# Patient Record
Sex: FEMALE | Race: WHITE | NOT HISPANIC OR LATINO | Employment: FULL TIME | URBAN - METROPOLITAN AREA
[De-identification: names, ages, dates, MRNs, and addresses within clinical notes are randomized per-mention and may not be internally consistent; named-entity substitution may affect disease eponyms.]

---

## 2017-07-17 PROBLEM — R87.810 PAPANICOLAOU SMEAR OF CERVIX WITH POSITIVE HIGH RISK HUMAN PAPILLOMA VIRUS (HPV) TEST: Status: ACTIVE | Noted: 2017-07-17

## 2023-04-17 ENCOUNTER — OFFICE VISIT (OUTPATIENT)
Dept: PRIMARY CARE | Facility: CLINIC | Age: 58
End: 2023-04-17
Payer: COMMERCIAL

## 2023-04-17 VITALS
TEMPERATURE: 97.1 F | BODY MASS INDEX: 27.87 KG/M2 | WEIGHT: 173.4 LBS | HEIGHT: 66 IN | DIASTOLIC BLOOD PRESSURE: 84 MMHG | SYSTOLIC BLOOD PRESSURE: 122 MMHG

## 2023-04-17 DIAGNOSIS — M62.838 MUSCLE SPASM: Primary | ICD-10-CM

## 2023-04-17 PROCEDURE — 3008F BODY MASS INDEX DOCD: CPT | Performed by: INTERNAL MEDICINE

## 2023-04-17 PROCEDURE — 99213 OFFICE O/P EST LOW 20 MIN: CPT | Performed by: INTERNAL MEDICINE

## 2023-04-17 RX ORDER — CYCLOBENZAPRINE HCL 10 MG
10 TABLET ORAL 3 TIMES DAILY PRN
Qty: 30 TABLET | Refills: 0 | Status: SHIPPED | OUTPATIENT
Start: 2023-04-17 | End: 2023-05-01 | Stop reason: SDUPTHER

## 2023-04-17 RX ORDER — IBUPROFEN 400 MG/1
400 TABLET ORAL EVERY 8 HOURS PRN
Qty: 30 TABLET | Refills: 0 | Status: SHIPPED | OUTPATIENT
Start: 2023-04-17 | End: 2023-04-27

## 2023-04-17 RX ORDER — ONDANSETRON 4 MG/1
TABLET, ORALLY DISINTEGRATING ORAL
COMMUNITY
Start: 2022-09-21 | End: 2024-05-13 | Stop reason: WASHOUT

## 2023-04-17 RX ORDER — PAROXETINE 10 MG/1
1 TABLET, FILM COATED ORAL DAILY
COMMUNITY
Start: 2023-04-03 | End: 2024-03-28

## 2023-04-17 RX ORDER — LIDOCAINE 50 MG/G
PATCH TOPICAL
COMMUNITY
Start: 2022-11-09 | End: 2023-06-12 | Stop reason: SDUPTHER

## 2023-04-17 RX ORDER — ALLOPURINOL 100 MG/1
1 TABLET ORAL DAILY
COMMUNITY
End: 2023-08-02 | Stop reason: SDUPTHER

## 2023-04-17 RX ORDER — BETAMETHASONE DIPROPIONATE 0.5 MG/G
LOTION TOPICAL
COMMUNITY
End: 2024-05-13 | Stop reason: SDUPTHER

## 2023-04-17 RX ORDER — FLUTICASONE PROPIONATE 50 MCG
2 SPRAY, SUSPENSION (ML) NASAL DAILY
COMMUNITY
Start: 2022-03-08

## 2023-04-17 RX ORDER — FLUCONAZOLE 150 MG/1
150 TABLET ORAL
COMMUNITY
End: 2024-05-13 | Stop reason: WASHOUT

## 2023-04-17 RX ORDER — OMEPRAZOLE 40 MG/1
CAPSULE, DELAYED RELEASE ORAL
COMMUNITY
Start: 2022-09-21 | End: 2024-05-13 | Stop reason: WASHOUT

## 2023-04-17 RX ORDER — TIZANIDINE 4 MG/1
1 TABLET ORAL DAILY PRN
COMMUNITY
Start: 2022-09-29 | End: 2023-05-01 | Stop reason: SDUPTHER

## 2023-04-17 RX ORDER — BISMUTH SUBSALICYLATE 525 MG/30ML
LIQUID ORAL
COMMUNITY
Start: 2022-05-26

## 2023-04-17 ASSESSMENT — PATIENT HEALTH QUESTIONNAIRE - PHQ9
1. LITTLE INTEREST OR PLEASURE IN DOING THINGS: NOT AT ALL
SUM OF ALL RESPONSES TO PHQ9 QUESTIONS 1 AND 2: 0
2. FEELING DOWN, DEPRESSED OR HOPELESS: NOT AT ALL
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
## 2023-04-26 ENCOUNTER — TELEMEDICINE (OUTPATIENT)
Dept: PRIMARY CARE | Facility: CLINIC | Age: 58
End: 2023-04-26
Payer: COMMERCIAL

## 2023-04-26 DIAGNOSIS — G43.909 MIGRAINE WITHOUT STATUS MIGRAINOSUS, NOT INTRACTABLE, UNSPECIFIED MIGRAINE TYPE: Primary | ICD-10-CM

## 2023-04-26 PROCEDURE — 99213 OFFICE O/P EST LOW 20 MIN: CPT | Performed by: INTERNAL MEDICINE

## 2023-04-26 RX ORDER — SUMATRIPTAN 50 MG/1
50 TABLET, FILM COATED ORAL ONCE AS NEEDED
Qty: 9 TABLET | Refills: 5 | Status: SHIPPED | OUTPATIENT
Start: 2023-04-26 | End: 2024-05-29 | Stop reason: SDUPTHER

## 2023-04-26 NOTE — PROGRESS NOTES
Subjective   Patient ID: [id number]   HPI  Virtual visit  Ida Diego is a 58 y.o. female who presents for No chief complaint on file..She have h/o migraine before. She had a intense headache yesterday and had CT scan did not show any abnormality.She denies any fever or skin rash.        Objective   There were no vitals taken for this visit.   Review of Systems  All 12 systems reviewed, no other abnormality except that mentioned in HPI.    Physical Exam    Assessment/Plan   Problem List Items Addressed This Visit    None  Visit Diagnoses       Migraine without status migrainosus, not intractable, unspecified migraine type    -  Primary    Relevant Medications    SUMAtriptan (Imitrex) 50 mg tablet            Chaim Glover MD

## 2023-05-01 DIAGNOSIS — M62.838 MUSCLE SPASM: Primary | ICD-10-CM

## 2023-05-01 DIAGNOSIS — M62.838 MUSCLE SPASM: ICD-10-CM

## 2023-05-01 RX ORDER — TIZANIDINE 4 MG/1
4 TABLET ORAL EVERY 8 HOURS PRN
Qty: 30 TABLET | Refills: 0 | Status: SHIPPED | OUTPATIENT
Start: 2023-05-01 | End: 2023-05-01 | Stop reason: SDUPTHER

## 2023-05-01 RX ORDER — CYCLOBENZAPRINE HCL 10 MG
10 TABLET ORAL 3 TIMES DAILY PRN
Qty: 30 TABLET | Refills: 0 | OUTPATIENT
Start: 2023-05-01 | End: 2023-11-08

## 2023-05-01 RX ORDER — TIZANIDINE 4 MG/1
4 TABLET ORAL EVERY 8 HOURS PRN
Qty: 30 TABLET | Refills: 0 | Status: SHIPPED | OUTPATIENT
Start: 2023-05-01 | End: 2023-05-01

## 2023-06-08 ENCOUNTER — OFFICE VISIT (OUTPATIENT)
Dept: PRIMARY CARE | Facility: CLINIC | Age: 58
End: 2023-06-08
Payer: COMMERCIAL

## 2023-06-08 VITALS
TEMPERATURE: 97.6 F | SYSTOLIC BLOOD PRESSURE: 110 MMHG | DIASTOLIC BLOOD PRESSURE: 84 MMHG | BODY MASS INDEX: 27.76 KG/M2 | WEIGHT: 174.6 LBS

## 2023-06-08 DIAGNOSIS — R07.81 RIB PAIN: Primary | ICD-10-CM

## 2023-06-08 PROCEDURE — 1036F TOBACCO NON-USER: CPT | Performed by: INTERNAL MEDICINE

## 2023-06-08 PROCEDURE — 3008F BODY MASS INDEX DOCD: CPT | Performed by: INTERNAL MEDICINE

## 2023-06-08 PROCEDURE — 99213 OFFICE O/P EST LOW 20 MIN: CPT | Performed by: INTERNAL MEDICINE

## 2023-06-08 RX ORDER — ALPRAZOLAM 0.25 MG/1
TABLET ORAL AS NEEDED
COMMUNITY
Start: 2020-08-25

## 2023-06-08 ASSESSMENT — PATIENT HEALTH QUESTIONNAIRE - PHQ9
1. LITTLE INTEREST OR PLEASURE IN DOING THINGS: NOT AT ALL
SUM OF ALL RESPONSES TO PHQ9 QUESTIONS 1 AND 2: 1
2. FEELING DOWN, DEPRESSED OR HOPELESS: SEVERAL DAYS

## 2023-06-08 NOTE — LETTER
June 8, 2023     Patient: Ida Diego   YOB: 1965   Date of Visit: 6/8/2023       To Whom It May Concern:    Ida Diego was seen in my clinic on 6/8/2023 at 12:00 pm. Please excuse Ida for her absence from work on this day and she need rest tomorrow for her medical condition.    If you have any questions or concerns, please don't hesitate to call.         Sincerely,         Chaim Glover MD        CC: No Recipients
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
## 2023-06-12 ENCOUNTER — TELEPHONE (OUTPATIENT)
Dept: PRIMARY CARE | Facility: CLINIC | Age: 58
End: 2023-06-12
Payer: COMMERCIAL

## 2023-06-12 DIAGNOSIS — R07.81 RIB PAIN: Primary | ICD-10-CM

## 2023-06-12 RX ORDER — LIDOCAINE 50 MG/G
PATCH TOPICAL
Qty: 20 PATCH | Refills: 0 | Status: SHIPPED | OUTPATIENT
Start: 2023-06-12 | End: 2023-06-14 | Stop reason: SDUPTHER

## 2023-06-12 NOTE — TELEPHONE ENCOUNTER
----- Message from Chaim Glover MD sent at 6/12/2023 12:11 PM EDT -----   No rib fracture on the xray.  ----- Message -----  From: [employer] - Radiology Results In  Sent: 6/9/2023   5:06 PM EDT  To: Chaim Glover MD

## 2023-06-13 ENCOUNTER — TELEPHONE (OUTPATIENT)
Dept: PRIMARY CARE | Facility: CLINIC | Age: 58
End: 2023-06-13
Payer: COMMERCIAL

## 2023-06-14 DIAGNOSIS — R07.81 RIB PAIN: ICD-10-CM

## 2023-06-14 DIAGNOSIS — F41.9 ANXIETY: Primary | ICD-10-CM

## 2023-06-14 DIAGNOSIS — M79.2 NEUROPATHIC PAIN: Primary | ICD-10-CM

## 2023-06-14 RX ORDER — LIDOCAINE 50 MG/G
PATCH TOPICAL
Qty: 20 PATCH | Refills: 0 | OUTPATIENT
Start: 2023-06-14 | End: 2023-11-08

## 2023-06-14 RX ORDER — BUPROPION HYDROCHLORIDE 300 MG/1
300 TABLET ORAL EVERY MORNING
Qty: 30 TABLET | Refills: 0 | Status: SHIPPED | OUTPATIENT
Start: 2023-06-14 | End: 2023-08-02 | Stop reason: SDUPTHER

## 2023-06-14 NOTE — TELEPHONE ENCOUNTER
Sent message to pt about scheduling with Dr. Glover for full evaluation on Bupropion. It can either be virtual or in person.

## 2023-07-19 ENCOUNTER — OFFICE VISIT (OUTPATIENT)
Dept: PRIMARY CARE | Facility: CLINIC | Age: 58
End: 2023-07-19
Payer: COMMERCIAL

## 2023-07-19 VITALS — BODY MASS INDEX: 28.08 KG/M2 | WEIGHT: 176.6 LBS

## 2023-07-19 DIAGNOSIS — L65.9 FALLING HAIR: Primary | ICD-10-CM

## 2023-07-19 PROCEDURE — 99213 OFFICE O/P EST LOW 20 MIN: CPT | Performed by: INTERNAL MEDICINE

## 2023-07-19 PROCEDURE — 3008F BODY MASS INDEX DOCD: CPT | Performed by: INTERNAL MEDICINE

## 2023-07-19 PROCEDURE — 1036F TOBACCO NON-USER: CPT | Performed by: INTERNAL MEDICINE

## 2023-07-19 RX ORDER — TIZANIDINE 4 MG/1
4 TABLET ORAL DAILY PRN
COMMUNITY
Start: 2023-06-19

## 2023-07-19 ASSESSMENT — PATIENT HEALTH QUESTIONNAIRE - PHQ9
2. FEELING DOWN, DEPRESSED OR HOPELESS: SEVERAL DAYS
1. LITTLE INTEREST OR PLEASURE IN DOING THINGS: NOT AT ALL
SUM OF ALL RESPONSES TO PHQ9 QUESTIONS 1 AND 2: 1

## 2023-07-19 NOTE — PROGRESS NOTES
Subjective   Patient ID: [id number]   [facility] [place]    Ida Diego is a 58 y.o. female who presents for Follow-up (She had bariatric surgery last year (10/15/2022). Hair started falling out last month, she switched up her vitamins thinking that might help and it hasn't. Unexplained weight gain. - Dietician told her to increase protein to help her hair.).  She showed me a lot of hair fall together today.      Objective   There were no vitals taken for this visit.   Review of Systems  All 12 systems reviewed, no other abnormality except that mentioned in HPI.    Physical Exam    Assessment/Plan   Problem List Items Addressed This Visit    None  Visit Diagnoses       Falling hair    -  Primary    Relevant Orders    Referral to Dermatology    Comprehensive Metabolic Panel          She is following with bariatric surgeon and her lab work done there. She started taking lot of protein.  Chaim Glover MD

## 2023-07-19 NOTE — LETTER
July 19, 2023     Patient: Ida Diego   YOB: 1965   Date of Visit: 7/19/2023       To Whom It May Concern:    Ida Diego was seen in my clinic on 7/19/2023 at 10:30 am. Please excuse Ida for her absence from work on this day to make the appointment.    If you have any questions or concerns, please don't hesitate to call.         Sincerely,         Chaim Glover MD        CC: No Recipients

## 2023-08-02 ENCOUNTER — OFFICE VISIT (OUTPATIENT)
Dept: PRIMARY CARE | Facility: CLINIC | Age: 58
End: 2023-08-02
Payer: COMMERCIAL

## 2023-08-02 VITALS
TEMPERATURE: 97.5 F | WEIGHT: 176.8 LBS | DIASTOLIC BLOOD PRESSURE: 80 MMHG | SYSTOLIC BLOOD PRESSURE: 110 MMHG | BODY MASS INDEX: 28.11 KG/M2

## 2023-08-02 DIAGNOSIS — F41.9 ANXIETY: ICD-10-CM

## 2023-08-02 DIAGNOSIS — Z12.31 ENCOUNTER FOR SCREENING MAMMOGRAM FOR MALIGNANT NEOPLASM OF BREAST: ICD-10-CM

## 2023-08-02 DIAGNOSIS — Z12.11 SCREENING FOR COLON CANCER: ICD-10-CM

## 2023-08-02 DIAGNOSIS — M10.00 IDIOPATHIC GOUT, UNSPECIFIED CHRONICITY, UNSPECIFIED SITE: Primary | ICD-10-CM

## 2023-08-02 PROCEDURE — 1036F TOBACCO NON-USER: CPT | Performed by: INTERNAL MEDICINE

## 2023-08-02 PROCEDURE — 3008F BODY MASS INDEX DOCD: CPT | Performed by: INTERNAL MEDICINE

## 2023-08-02 PROCEDURE — 99213 OFFICE O/P EST LOW 20 MIN: CPT | Performed by: INTERNAL MEDICINE

## 2023-08-02 RX ORDER — BUPROPION HYDROCHLORIDE 300 MG/1
300 TABLET ORAL EVERY MORNING
Qty: 90 TABLET | Refills: 1 | Status: SHIPPED | OUTPATIENT
Start: 2023-08-02 | End: 2024-03-28 | Stop reason: SDUPTHER

## 2023-08-02 RX ORDER — ALLOPURINOL 100 MG/1
100 TABLET ORAL DAILY
Qty: 90 TABLET | Refills: 1 | Status: SHIPPED | OUTPATIENT
Start: 2023-08-02 | End: 2024-03-28 | Stop reason: SDUPTHER

## 2023-08-02 ASSESSMENT — PATIENT HEALTH QUESTIONNAIRE - PHQ9
2. FEELING DOWN, DEPRESSED OR HOPELESS: SEVERAL DAYS
SUM OF ALL RESPONSES TO PHQ9 QUESTIONS 1 AND 2: 1
1. LITTLE INTEREST OR PLEASURE IN DOING THINGS: NOT AT ALL

## 2023-08-27 LAB — NONINV COLON CA DNA+OCC BLD SCRN STL QL: NEGATIVE

## 2023-09-13 RX ORDER — FLUOROURACIL 50 MG/G
1 CREAM TOPICAL
COMMUNITY
Start: 2023-08-04 | End: 2024-05-13 | Stop reason: WASHOUT

## 2023-09-13 RX ORDER — TRIAMCINOLONE ACETONIDE 1 MG/G
1 CREAM TOPICAL
COMMUNITY
Start: 2023-08-04 | End: 2024-05-13 | Stop reason: WASHOUT

## 2023-09-17 PROBLEM — F98.8 ATTENTION DEFICIT DISORDER: Status: ACTIVE | Noted: 2023-09-17

## 2023-09-17 PROBLEM — J31.0 RHINITIS, CHRONIC: Status: ACTIVE | Noted: 2023-09-17

## 2023-09-17 PROBLEM — E66.01 MORBID OBESITY (MULTI): Status: ACTIVE | Noted: 2023-09-17

## 2023-09-17 PROBLEM — N89.8 DISCHARGE FROM THE VAGINA: Status: ACTIVE | Noted: 2023-09-17

## 2023-09-17 PROBLEM — F41.8 DEPRESSION WITH ANXIETY: Status: ACTIVE | Noted: 2023-09-17

## 2023-09-17 PROBLEM — R23.2 HOT FLASHES: Status: ACTIVE | Noted: 2023-09-17

## 2023-09-17 PROBLEM — Z98.84 BARIATRIC SURGERY STATUS: Status: ACTIVE | Noted: 2023-09-17

## 2023-09-17 PROBLEM — L65.0 TELOGEN EFFLUVIUM: Status: ACTIVE | Noted: 2023-08-04

## 2023-09-17 PROBLEM — I10 HYPERTENSION: Status: ACTIVE | Noted: 2023-09-17

## 2023-09-17 PROBLEM — R92.8 ABNORMAL MAMMOGRAM: Status: ACTIVE | Noted: 2023-09-17

## 2023-09-17 PROBLEM — N63.20 LEFT BREAST MASS: Status: ACTIVE | Noted: 2023-09-17

## 2023-09-17 PROBLEM — E78.5 HYPERLIPIDEMIA: Status: ACTIVE | Noted: 2023-09-17

## 2023-09-17 PROBLEM — G47.33 OBSTRUCTIVE SLEEP APNEA, ADULT: Status: ACTIVE | Noted: 2023-09-17

## 2023-09-17 PROBLEM — R63.5 ABNORMAL WEIGHT GAIN: Status: ACTIVE | Noted: 2023-09-17

## 2023-09-17 PROBLEM — L98.9 SKIN LESION OF FACE: Status: ACTIVE | Noted: 2023-09-17

## 2023-09-17 PROBLEM — M54.9 BACK PAIN, CHRONIC: Status: ACTIVE | Noted: 2023-09-17

## 2023-09-17 PROBLEM — I87.2 VENOUS INSUFFICIENCY, PERIPHERAL: Status: ACTIVE | Noted: 2023-09-17

## 2023-09-17 PROBLEM — R29.818 SUSPECTED SLEEP APNEA: Status: ACTIVE | Noted: 2023-09-17

## 2023-09-17 PROBLEM — R06.83 SNORING: Status: ACTIVE | Noted: 2023-09-17

## 2023-09-17 PROBLEM — G47.00 INSOMNIA: Status: ACTIVE | Noted: 2023-09-17

## 2023-09-17 PROBLEM — M72.2 PLANTAR FASCIITIS OF LEFT FOOT: Status: ACTIVE | Noted: 2019-09-25

## 2023-09-17 PROBLEM — M72.2 PLANTAR FASCIITIS OF RIGHT FOOT: Status: ACTIVE | Noted: 2019-09-25

## 2023-09-17 PROBLEM — K21.9 GERD (GASTROESOPHAGEAL REFLUX DISEASE): Status: ACTIVE | Noted: 2023-09-17

## 2023-09-17 PROBLEM — Z98.84 S/P GASTRIC BYPASS: Status: ACTIVE | Noted: 2023-09-17

## 2023-09-17 PROBLEM — L57.0 ACTINIC KERATOSIS: Status: ACTIVE | Noted: 2023-08-04

## 2023-09-17 PROBLEM — N95.1 POSTMENOPAUSAL DISORDER: Status: ACTIVE | Noted: 2023-09-17

## 2023-09-17 PROBLEM — L23.89 ALLERGIC CONTACT DERMATITIS DUE TO OTHER AGENTS: Status: ACTIVE | Noted: 2023-09-17

## 2023-09-17 PROBLEM — M10.9 GOUT: Status: ACTIVE | Noted: 2023-09-17

## 2023-09-17 PROBLEM — R73.01 ELEVATED FASTING BLOOD SUGAR: Status: ACTIVE | Noted: 2023-09-17

## 2023-09-17 PROBLEM — R76.8 ANA POSITIVE: Status: ACTIVE | Noted: 2023-09-17

## 2023-09-17 PROBLEM — M77.30 POSTERIOR CALCANEAL EXOSTOSIS: Status: ACTIVE | Noted: 2019-09-25

## 2023-09-17 PROBLEM — L28.2 PRURITIC RASH: Status: ACTIVE | Noted: 2023-09-17

## 2023-09-17 PROBLEM — R73.03 PRE-DIABETES: Status: ACTIVE | Noted: 2023-09-17

## 2023-09-17 PROBLEM — G89.29 BACK PAIN, CHRONIC: Status: ACTIVE | Noted: 2023-09-17

## 2023-09-17 PROBLEM — G47.9 SLEEP DISORDER: Status: ACTIVE | Noted: 2023-09-17

## 2023-09-17 PROBLEM — R76.8 POSITIVE COOMBS TEST: Status: ACTIVE | Noted: 2023-09-17

## 2023-09-17 PROBLEM — R87.810 PAPANICOLAOU SMEAR OF CERVIX WITH POSITIVE HIGH RISK HUMAN PAPILLOMA VIRUS (HPV) TEST: Status: ACTIVE | Noted: 2017-07-17

## 2023-09-17 PROBLEM — F41.9 ANXIETY: Status: ACTIVE | Noted: 2023-09-17

## 2023-09-17 PROBLEM — R60.9 EDEMA: Status: ACTIVE | Noted: 2023-09-17

## 2023-09-17 PROBLEM — M62.838 MUSCLE SPASM: Status: ACTIVE | Noted: 2023-09-17

## 2023-09-17 PROBLEM — R06.00 DYSPNEA: Status: ACTIVE | Noted: 2023-09-17

## 2023-10-16 ENCOUNTER — APPOINTMENT (OUTPATIENT)
Dept: SURGERY | Facility: CLINIC | Age: 58
End: 2023-10-16
Payer: COMMERCIAL

## 2023-10-16 PROBLEM — E66.3 OVERWEIGHT (BMI 25.0-29.9): Status: ACTIVE | Noted: 2023-09-17

## 2023-10-16 PROBLEM — K91.2 POSTOPERATIVE MALABSORPTION (HHS-HCC): Status: ACTIVE | Noted: 2023-10-16

## 2023-11-07 ENCOUNTER — HOSPITAL ENCOUNTER (EMERGENCY)
Facility: HOSPITAL | Age: 58
Discharge: HOME | End: 2023-11-08
Payer: MEDICARE

## 2023-11-07 ENCOUNTER — APPOINTMENT (OUTPATIENT)
Dept: RADIOLOGY | Facility: HOSPITAL | Age: 58
End: 2023-11-07
Payer: MEDICARE

## 2023-11-07 VITALS
RESPIRATION RATE: 20 BRPM | BODY MASS INDEX: 25.9 KG/M2 | OXYGEN SATURATION: 98 % | TEMPERATURE: 98.4 F | SYSTOLIC BLOOD PRESSURE: 138 MMHG | WEIGHT: 165 LBS | HEIGHT: 67 IN | DIASTOLIC BLOOD PRESSURE: 91 MMHG | HEART RATE: 78 BPM
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
## 2023-11-07 DIAGNOSIS — R07.81 RIB PAIN ON RIGHT SIDE: ICD-10-CM

## 2023-11-07 PROCEDURE — 71250 CT THORAX DX C-: CPT

## 2023-11-07 PROCEDURE — 2500000005 HC RX 250 GENERAL PHARMACY W/O HCPCS

## 2023-11-07 PROCEDURE — 99284 EMERGENCY DEPT VISIT MOD MDM: CPT | Mod: 25

## 2023-11-07 PROCEDURE — 2500000001 HC RX 250 WO HCPCS SELF ADMINISTERED DRUGS (ALT 637 FOR MEDICARE OP)

## 2023-11-07 PROCEDURE — 71250 CT THORAX DX C-: CPT | Performed by: RADIOLOGY

## 2023-11-07 PROCEDURE — 99285 EMERGENCY DEPT VISIT HI MDM: CPT | Mod: 25

## 2023-11-07 PROCEDURE — 2500000004 HC RX 250 GENERAL PHARMACY W/ HCPCS (ALT 636 FOR OP/ED)

## 2023-11-07 PROCEDURE — 96372 THER/PROPH/DIAG INJ SC/IM: CPT

## 2023-11-07 RX ORDER — TRAMADOL HYDROCHLORIDE 50 MG/1
50 TABLET ORAL EVERY 6 HOURS PRN
Status: DISCONTINUED | OUTPATIENT
Start: 2023-11-07 | End: 2023-11-08 | Stop reason: HOSPADM

## 2023-11-07 RX ORDER — ORPHENADRINE CITRATE 30 MG/ML
60 INJECTION INTRAMUSCULAR; INTRAVENOUS ONCE
Status: COMPLETED | OUTPATIENT
Start: 2023-11-07 | End: 2023-11-07

## 2023-11-07 RX ORDER — LIDOCAINE 560 MG/1
1 PATCH PERCUTANEOUS; TOPICAL; TRANSDERMAL ONCE
Status: DISCONTINUED | OUTPATIENT
Start: 2023-11-07 | End: 2023-11-08 | Stop reason: HOSPADM

## 2023-11-07 RX ORDER — AMOXICILLIN AND CLAVULANATE POTASSIUM 875; 125 MG/1; MG/1
1 TABLET, FILM COATED ORAL EVERY 12 HOURS
Qty: 20 TABLET | Refills: 0 | Status: SHIPPED | OUTPATIENT
Start: 2023-11-07 | End: 2023-11-07 | Stop reason: WASHOUT

## 2023-11-07 RX ADMIN — ORPHENADRINE CITRATE 60 MG: 60 INJECTION INTRAMUSCULAR; INTRAVENOUS at 21:50

## 2023-11-07 RX ADMIN — TRAMADOL HYDROCHLORIDE 50 MG: 50 TABLET, COATED ORAL at 21:50

## 2023-11-07 RX ADMIN — LIDOCAINE 1 PATCH: 4 PATCH TOPICAL at 21:50

## 2023-11-07 ASSESSMENT — COLUMBIA-SUICIDE SEVERITY RATING SCALE - C-SSRS
1. IN THE PAST MONTH, HAVE YOU WISHED YOU WERE DEAD OR WISHED YOU COULD GO TO SLEEP AND NOT WAKE UP?: NO
2. HAVE YOU ACTUALLY HAD ANY THOUGHTS OF KILLING YOURSELF?: NO
6. HAVE YOU EVER DONE ANYTHING, STARTED TO DO ANYTHING, OR PREPARED TO DO ANYTHING TO END YOUR LIFE?: NO

## 2023-11-07 ASSESSMENT — LIFESTYLE VARIABLES
REASON UNABLE TO ASSESS: NO
HAVE YOU EVER FELT YOU SHOULD CUT DOWN ON YOUR DRINKING: NO
HAVE PEOPLE ANNOYED YOU BY CRITICIZING YOUR DRINKING: NO
EVER FELT BAD OR GUILTY ABOUT YOUR DRINKING: NO
EVER HAD A DRINK FIRST THING IN THE MORNING TO STEADY YOUR NERVES TO GET RID OF A HANGOVER: NO

## 2023-11-07 ASSESSMENT — PAIN - FUNCTIONAL ASSESSMENT: PAIN_FUNCTIONAL_ASSESSMENT: 0-10

## 2023-11-07 ASSESSMENT — PAIN DESCRIPTION - LOCATION: LOCATION: CHEST
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
## 2023-11-08 RX ORDER — CYCLOBENZAPRINE HCL 10 MG
10 TABLET ORAL 3 TIMES DAILY PRN
Qty: 20 TABLET | Refills: 0 | Status: SHIPPED | OUTPATIENT
Start: 2023-11-08 | End: 2024-05-13 | Stop reason: WASHOUT

## 2023-11-08 RX ORDER — LIDOCAINE 50 MG/G
1 PATCH TOPICAL DAILY
Qty: 15 PATCH | Refills: 0 | Status: SHIPPED | OUTPATIENT
Start: 2023-11-08 | End: 2024-05-13 | Stop reason: SDUPTHER
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
## 2023-11-13 DIAGNOSIS — T30.0 BURN INJURY: Primary | ICD-10-CM

## 2023-11-13 RX ORDER — SILVER SULFADIAZINE 10 G/1000G
CREAM TOPICAL
Qty: 400 G | Refills: 0 | Status: SHIPPED | OUTPATIENT
Start: 2023-11-13 | End: 2024-01-12

## 2023-11-29 ENCOUNTER — APPOINTMENT (OUTPATIENT)
Dept: PRIMARY CARE | Facility: CLINIC | Age: 58
End: 2023-11-29
Payer: COMMERCIAL

## 2024-02-12 ENCOUNTER — APPOINTMENT (OUTPATIENT)
Dept: DERMATOLOGY | Facility: CLINIC | Age: 59
End: 2024-02-12
Payer: COMMERCIAL

## 2024-03-28 ENCOUNTER — OFFICE VISIT (OUTPATIENT)
Dept: PRIMARY CARE | Facility: CLINIC | Age: 59
End: 2024-03-28
Payer: COMMERCIAL

## 2024-03-28 VITALS
HEART RATE: 60 BPM | WEIGHT: 168.6 LBS | TEMPERATURE: 97.4 F | DIASTOLIC BLOOD PRESSURE: 80 MMHG | SYSTOLIC BLOOD PRESSURE: 116 MMHG | BODY MASS INDEX: 26.41 KG/M2 | OXYGEN SATURATION: 97 %

## 2024-03-28 DIAGNOSIS — M10.00 IDIOPATHIC GOUT, UNSPECIFIED CHRONICITY, UNSPECIFIED SITE: Primary | ICD-10-CM

## 2024-03-28 DIAGNOSIS — F41.9 ANXIETY: ICD-10-CM

## 2024-03-28 PROCEDURE — 99214 OFFICE O/P EST MOD 30 MIN: CPT | Performed by: STUDENT IN AN ORGANIZED HEALTH CARE EDUCATION/TRAINING PROGRAM

## 2024-03-28 PROCEDURE — 3074F SYST BP LT 130 MM HG: CPT | Performed by: STUDENT IN AN ORGANIZED HEALTH CARE EDUCATION/TRAINING PROGRAM

## 2024-03-28 PROCEDURE — 1036F TOBACCO NON-USER: CPT | Performed by: STUDENT IN AN ORGANIZED HEALTH CARE EDUCATION/TRAINING PROGRAM

## 2024-03-28 PROCEDURE — 3079F DIAST BP 80-89 MM HG: CPT | Performed by: STUDENT IN AN ORGANIZED HEALTH CARE EDUCATION/TRAINING PROGRAM

## 2024-03-28 RX ORDER — BUPROPION HYDROCHLORIDE 300 MG/1
300 TABLET ORAL EVERY MORNING
Qty: 90 TABLET | Refills: 0 | Status: SHIPPED | OUTPATIENT
Start: 2024-03-28

## 2024-03-28 RX ORDER — PREDNISONE 20 MG/1
40 TABLET ORAL
Qty: 10 TABLET | Refills: 0 | Status: SHIPPED | OUTPATIENT
Start: 2024-03-28 | End: 2024-04-02

## 2024-03-28 RX ORDER — ALLOPURINOL 100 MG/1
100 TABLET ORAL DAILY
Qty: 90 TABLET | Refills: 0 | Status: SHIPPED | OUTPATIENT
Start: 2024-03-28

## 2024-03-28 ASSESSMENT — ENCOUNTER SYMPTOMS
NAUSEA: 0
DEPRESSION: 0
DIZZINESS: 0
FATIGUE: 0
FEVER: 0
OCCASIONAL FEELINGS OF UNSTEADINESS: 0
CONSTIPATION: 0
FREQUENCY: 0
PALPITATIONS: 0
ABDOMINAL PAIN: 0
HEADACHES: 0
HEMATURIA: 0
NERVOUS/ANXIOUS: 0
LOSS OF SENSATION IN FEET: 0
NUMBNESS: 0
DYSURIA: 0
DIARRHEA: 0
COUGH: 0
SHORTNESS OF BREATH: 0
DYSPHORIC MOOD: 0
CHILLS: 0
WHEEZING: 0

## 2024-03-28 ASSESSMENT — PATIENT HEALTH QUESTIONNAIRE - PHQ9
2. FEELING DOWN, DEPRESSED OR HOPELESS: NOT AT ALL
SUM OF ALL RESPONSES TO PHQ9 QUESTIONS 1 AND 2: 0
1. LITTLE INTEREST OR PLEASURE IN DOING THINGS: NOT AT ALL

## 2024-03-28 NOTE — PROGRESS NOTES
Subjective   Patient ID: Ida Diego is a 59 y.o. female who presents for goout.    HPI   Patient here for an acute visit to discuss possible gout flare x 1 week.  She is on 100 mg of allopurinol but I do not see a uric acid test in her blood work. Has been off allopurinol for about 2 months.     Having pain and warmth in her L lateral foot. Has had a little swelling.   Has been drinking cherry juice.   She changed her diet, no sea food. Increased oatmeal, oranges, apples. No iced tea, no beans. She has been taking ibuprofen and salon pas.     No increased red meat, did have more alcohol than normal on vacation. Did have increased sea food as well.    Needs refill on her Wellbutrin.  She has been out of that for about 2 months.      Review of Systems   Constitutional:  Negative for chills, fatigue and fever.   Respiratory:  Negative for cough, shortness of breath and wheezing.    Cardiovascular:  Negative for chest pain, palpitations and leg swelling.   Gastrointestinal:  Negative for abdominal pain, constipation, diarrhea and nausea.   Genitourinary:  Negative for dysuria, frequency, hematuria and urgency.   Neurological:  Negative for dizziness, numbness and headaches.   Psychiatric/Behavioral:  Negative for dysphoric mood. The patient is not nervous/anxious.        Objective   /80 (BP Location: Left arm, Patient Position: Sitting, BP Cuff Size: Adult)   Pulse 60   Temp 36.3 °C (97.4 °F) (Temporal)   Wt 76.5 kg (168 lb 9.6 oz)   SpO2 97%   BMI 26.41 kg/m²     Physical Exam  Vitals reviewed.   Constitutional:       Appearance: Normal appearance.   HENT:      Head: Normocephalic and atraumatic.   Eyes:      Extraocular Movements: Extraocular movements intact.      Pupils: Pupils are equal, round, and reactive to light.   Pulmonary:      Effort: Pulmonary effort is normal.   Feet:      Comments: Tender to palpation fifth TMT joint and and base of fifth metatarsal.  Warmth noted.  No numbness or  tingling.  Full range of motion.  Skin:     General: Skin is warm and dry.   Neurological:      General: No focal deficit present.      Mental Status: She is alert.   Psychiatric:         Mood and Affect: Mood normal.         Behavior: Behavior normal.         Thought Content: Thought content normal.         Assessment/Plan   Problem List Items Addressed This Visit       Anxiety    Relevant Medications    buPROPion XL (Wellbutrin XL) 300 mg 24 hr tablet    Gout    Relevant Medications    allopurinol (Zyloprim) 100 mg tablet    predniSONE (Deltasone) 20 mg tablet    Other Relevant Orders    Uric acid     Gout versus possible peroneal tendinitis. Will check uric acid and prescription for prednisone sent to pharmacy.  I am restarting her Wellbutrin as well as her allopurinol but do not want her to start back on allopurinol until after her gout flare has resolved.  She did increase her alcohol intake while on vacation and normally does not drink a lot of alcohol.  She will let me know if she is not improving but in the meantime we will have her do this and follow-up with Agueda in about a month    Patient understands and agrees with treatment plan    Betsy Ojeda, DO   03/28/24

## 2024-03-28 NOTE — PATIENT INSTRUCTIONS
Will have you start prednisone, please take it with food and I would recommend taking in the morning  Will have you get on steroids and check blood work  Restarting your Wellbutrin as well as your allopurinol.  Please do not start allopurinol until after your gout flare has resolved

## 2024-04-02 ENCOUNTER — TELEPHONE (OUTPATIENT)
Dept: PRIMARY CARE | Facility: CLINIC | Age: 59
End: 2024-04-02
Payer: COMMERCIAL

## 2024-04-25 ENCOUNTER — APPOINTMENT (OUTPATIENT)
Dept: PRIMARY CARE | Facility: CLINIC | Age: 59
End: 2024-04-25
Payer: COMMERCIAL

## 2024-05-09 ENCOUNTER — APPOINTMENT (OUTPATIENT)
Dept: PRIMARY CARE | Facility: CLINIC | Age: 59
End: 2024-05-09
Payer: COMMERCIAL

## 2024-05-13 ENCOUNTER — OFFICE VISIT (OUTPATIENT)
Dept: PRIMARY CARE | Facility: CLINIC | Age: 59
End: 2024-05-13
Payer: COMMERCIAL

## 2024-05-13 VITALS
HEIGHT: 67 IN | BODY MASS INDEX: 27.15 KG/M2 | WEIGHT: 173 LBS | DIASTOLIC BLOOD PRESSURE: 84 MMHG | TEMPERATURE: 97.8 F | SYSTOLIC BLOOD PRESSURE: 120 MMHG

## 2024-05-13 DIAGNOSIS — Z00.00 HEALTHCARE MAINTENANCE: ICD-10-CM

## 2024-05-13 DIAGNOSIS — L50.9 URTICARIA: Primary | ICD-10-CM
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
## 2024-05-13 DIAGNOSIS — Z13.29 SCREENING FOR THYROID DISORDER: ICD-10-CM

## 2024-05-13 DIAGNOSIS — R73.09 ELEVATED GLUCOSE: ICD-10-CM

## 2024-05-13 DIAGNOSIS — E55.9 VITAMIN D DEFICIENCY: ICD-10-CM

## 2024-05-13 DIAGNOSIS — M54.40 ACUTE LEFT-SIDED LOW BACK PAIN WITH SCIATICA, SCIATICA LATERALITY UNSPECIFIED: ICD-10-CM

## 2024-05-13 DIAGNOSIS — R07.81 RIB PAIN ON RIGHT SIDE: ICD-10-CM

## 2024-05-13 DIAGNOSIS — R79.89 ABNORMAL CBC: ICD-10-CM

## 2024-05-13 DIAGNOSIS — Z98.84 BARIATRIC SURGERY STATUS: ICD-10-CM

## 2024-05-13 DIAGNOSIS — M79.605 PAIN OF LEFT LOWER EXTREMITY: ICD-10-CM

## 2024-05-13 DIAGNOSIS — I10 PRIMARY HYPERTENSION: ICD-10-CM

## 2024-05-13 PROBLEM — E87.6 HYPOKALEMIA: Status: ACTIVE | Noted: 2022-11-29

## 2024-05-13 PROBLEM — E66.3 OVERWEIGHT WITH BODY MASS INDEX (BMI) 25.0-29.9: Status: ACTIVE | Noted: 2023-09-17

## 2024-05-13 PROBLEM — K21.9 GASTROESOPHAGEAL REFLUX DISEASE: Status: ACTIVE | Noted: 2023-09-17

## 2024-05-13 PROBLEM — G47.33 OBSTRUCTIVE SLEEP APNEA SYNDROME IN ADULT: Status: ACTIVE | Noted: 2023-09-17

## 2024-05-13 PROBLEM — R11.2 NAUSEA AND VOMITING: Status: ACTIVE | Noted: 2022-11-29
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
## 2024-05-13 PROBLEM — E66.01 SEVERE OBESITY (MULTI): Status: ACTIVE | Noted: 2024-05-13

## 2024-05-13 PROBLEM — J31.0 CHRONIC RHINITIS: Status: ACTIVE | Noted: 2023-09-17

## 2024-05-13 PROBLEM — M25.512 PAIN OF LEFT SHOULDER REGION: Status: ACTIVE | Noted: 2024-05-13

## 2024-05-13 PROBLEM — M25.569 KNEE PAIN: Status: ACTIVE | Noted: 2024-05-13

## 2024-05-13 PROBLEM — R25.2 SPASM: Status: ACTIVE | Noted: 2023-09-17

## 2024-05-13 PROBLEM — F90.0 ATTENTION DEFICIT HYPERACTIVITY DISORDER (ADHD), PREDOMINANTLY INATTENTIVE TYPE: Status: ACTIVE | Noted: 2023-09-17

## 2024-05-13 PROBLEM — A04.8 HELICOBACTER PYLORI INFECTION: Status: ACTIVE | Noted: 2024-05-13

## 2024-05-13 PROBLEM — N89.8 VAGINAL DISCHARGE: Status: ACTIVE | Noted: 2023-09-17

## 2024-05-13 PROBLEM — R87.619 ABNORMAL CERVICAL PAPANICOLAOU SMEAR: Status: ACTIVE | Noted: 2017-07-17

## 2024-05-13 PROBLEM — N63.0 MASS OF BREAST: Status: ACTIVE | Noted: 2021-12-28

## 2024-05-13 PROBLEM — E66.01 MORBID OBESITY (MULTI): Status: ACTIVE | Noted: 2024-05-13

## 2024-05-13 PROBLEM — M54.50 LOW BACK PAIN: Status: ACTIVE | Noted: 2023-09-17

## 2024-05-13 PROBLEM — B96.89 ACUTE BACTERIAL SINUSITIS: Status: ACTIVE | Noted: 2024-05-13

## 2024-05-13 PROBLEM — I87.2 PERIPHERAL VENOUS INSUFFICIENCY: Status: ACTIVE | Noted: 2023-09-17

## 2024-05-13 PROBLEM — R23.2 FLUSHING: Status: ACTIVE | Noted: 2023-09-17

## 2024-05-13 PROBLEM — J01.90 ACUTE BACTERIAL SINUSITIS: Status: ACTIVE | Noted: 2024-05-13

## 2024-05-13 PROBLEM — R69 DISEASE SUSPECTED: Status: ACTIVE | Noted: 2023-09-17

## 2024-05-13 PROBLEM — L65.9 LOSS OF HAIR: Status: ACTIVE | Noted: 2024-05-13

## 2024-05-13 PROBLEM — R06.02 SHORTNESS OF BREATH: Status: ACTIVE | Noted: 2023-09-17

## 2024-05-13 PROBLEM — R73.03 PREDIABETES: Status: ACTIVE | Noted: 2022-03-09

## 2024-05-13 PROCEDURE — 3074F SYST BP LT 130 MM HG: CPT | Performed by: NURSE PRACTITIONER

## 2024-05-13 PROCEDURE — 3079F DIAST BP 80-89 MM HG: CPT | Performed by: NURSE PRACTITIONER

## 2024-05-13 PROCEDURE — 99215 OFFICE O/P EST HI 40 MIN: CPT | Performed by: NURSE PRACTITIONER

## 2024-05-13 PROCEDURE — 1036F TOBACCO NON-USER: CPT | Performed by: NURSE PRACTITIONER

## 2024-05-13 RX ORDER — CELECOXIB 200 MG/1
200 CAPSULE ORAL DAILY
Qty: 90 CAPSULE | Refills: 1 | Status: SHIPPED | OUTPATIENT
Start: 2024-05-13 | End: 2024-11-09

## 2024-05-13 RX ORDER — BETAMETHASONE DIPROPIONATE 0.5 MG/G
LOTION TOPICAL 2 TIMES DAILY
Qty: 15 G | Refills: 1 | Status: SHIPPED | OUTPATIENT
Start: 2024-05-13

## 2024-05-13 RX ORDER — MELOXICAM 15 MG/1
15 TABLET ORAL DAILY
COMMUNITY
End: 2024-05-31 | Stop reason: WASHOUT

## 2024-05-13 RX ORDER — DICLOFENAC SODIUM 10 MG/G
4 GEL TOPICAL 4 TIMES DAILY
Qty: 100 G | Refills: 1 | Status: SHIPPED | OUTPATIENT
Start: 2024-05-13

## 2024-05-13 RX ORDER — LIDOCAINE 50 MG/G
1 PATCH TOPICAL DAILY
Qty: 15 PATCH | Refills: 3 | Status: SHIPPED | OUTPATIENT
Start: 2024-05-13

## 2024-05-13 ASSESSMENT — PATIENT HEALTH QUESTIONNAIRE - PHQ9
SUM OF ALL RESPONSES TO PHQ9 QUESTIONS 1 AND 2: 0
2. FEELING DOWN, DEPRESSED OR HOPELESS: NOT AT ALL
1. LITTLE INTEREST OR PLEASURE IN DOING THINGS: NOT AT ALL

## 2024-05-13 NOTE — LETTER
May 13, 2024     Patient: Ida Diego   YOB: 1965   Date of Visit: 5/13/2024       To Whom It May Concern:    Ida Diego was seen in my clinic on 5/13/2024 at 11:00 am. Please excuse Ida for her absence from work on this day to make the appointment.    If you have any questions or concerns, please don't hesitate to call.         Sincerely,         JAMISON Arenas-CNP        CC: No Recipients

## 2024-05-13 NOTE — PATIENT INSTRUCTIONS
Medications refilled   You should fast 8 hours. Hydrate typically and no supplements the day of the test.  Medications refilled  Plan on follow up in 6 months and as needed.

## 2024-05-25 ENCOUNTER — HOSPITAL ENCOUNTER (EMERGENCY)
Facility: HOSPITAL | Age: 59
Discharge: HOME | End: 2024-05-25
Attending: STUDENT IN AN ORGANIZED HEALTH CARE EDUCATION/TRAINING PROGRAM
Payer: COMMERCIAL

## 2024-05-25 ENCOUNTER — APPOINTMENT (OUTPATIENT)
Dept: RADIOLOGY | Facility: HOSPITAL | Age: 59
End: 2024-05-25
Payer: COMMERCIAL

## 2024-05-25 ENCOUNTER — APPOINTMENT (OUTPATIENT)
Dept: CARDIOLOGY | Facility: HOSPITAL | Age: 59
End: 2024-05-25
Payer: COMMERCIAL

## 2024-05-25 VITALS
RESPIRATION RATE: 14 BRPM | OXYGEN SATURATION: 95 % | SYSTOLIC BLOOD PRESSURE: 103 MMHG | BODY MASS INDEX: 26.68 KG/M2 | HEIGHT: 67 IN | DIASTOLIC BLOOD PRESSURE: 79 MMHG | WEIGHT: 170 LBS | TEMPERATURE: 97.5 F | HEART RATE: 66 BPM

## 2024-05-25 DIAGNOSIS — R07.9 ACUTE CHEST PAIN: Primary | ICD-10-CM

## 2024-05-25 LAB
ALBUMIN SERPL BCP-MCNC: 4.3 G/DL (ref 3.4–5)
ALP SERPL-CCNC: 126 U/L (ref 33–136)
ALT SERPL W P-5'-P-CCNC: 20 U/L (ref 7–45)
ANION GAP SERPL CALC-SCNC: 12 MMOL/L (ref 10–20)
AST SERPL W P-5'-P-CCNC: 23 U/L (ref 9–39)
BASOPHILS # BLD AUTO: 0.04 X10*3/UL (ref 0–0.1)
BASOPHILS NFR BLD AUTO: 0.7 %
BILIRUB SERPL-MCNC: 0.6 MG/DL (ref 0–1.2)
BUN SERPL-MCNC: 20 MG/DL (ref 6–23)
CALCIUM SERPL-MCNC: 9.4 MG/DL (ref 8.6–10.3)
CARDIAC TROPONIN I PNL SERPL HS: <3 NG/L (ref 0–13)
CARDIAC TROPONIN I PNL SERPL HS: <3 NG/L (ref 0–13)
CHLORIDE SERPL-SCNC: 107 MMOL/L (ref 98–107)
CO2 SERPL-SCNC: 24 MMOL/L (ref 21–32)
CREAT SERPL-MCNC: 1.02 MG/DL (ref 0.5–1.05)
EGFRCR SERPLBLD CKD-EPI 2021: 63 ML/MIN/1.73M*2
EOSINOPHIL # BLD AUTO: 0.14 X10*3/UL (ref 0–0.7)
EOSINOPHIL NFR BLD AUTO: 2.3 %
ERYTHROCYTE [DISTWIDTH] IN BLOOD BY AUTOMATED COUNT: 12.9 % (ref 11.5–14.5)
GLUCOSE SERPL-MCNC: 79 MG/DL (ref 74–99)
HCT VFR BLD AUTO: 42.7 % (ref 36–46)
HGB BLD-MCNC: 14.3 G/DL (ref 12–16)
IMM GRANULOCYTES # BLD AUTO: 0.01 X10*3/UL (ref 0–0.7)
IMM GRANULOCYTES NFR BLD AUTO: 0.2 % (ref 0–0.9)
LIPASE SERPL-CCNC: 35 U/L (ref 9–82)
LYMPHOCYTES # BLD AUTO: 2.23 X10*3/UL (ref 1.2–4.8)
LYMPHOCYTES NFR BLD AUTO: 37 %
MAGNESIUM SERPL-MCNC: 1.81 MG/DL (ref 1.6–2.4)
MCH RBC QN AUTO: 30.2 PG (ref 26–34)
MCHC RBC AUTO-ENTMCNC: 33.5 G/DL (ref 32–36)
MCV RBC AUTO: 90 FL (ref 80–100)
MONOCYTES # BLD AUTO: 0.51 X10*3/UL (ref 0.1–1)
MONOCYTES NFR BLD AUTO: 8.5 %
NEUTROPHILS # BLD AUTO: 3.09 X10*3/UL (ref 1.2–7.7)
NEUTROPHILS NFR BLD AUTO: 51.3 %
NRBC BLD-RTO: 0 /100 WBCS (ref 0–0)
PLATELET # BLD AUTO: 192 X10*3/UL (ref 150–450)
POTASSIUM SERPL-SCNC: 3.5 MMOL/L (ref 3.5–5.3)
PROT SERPL-MCNC: 6.8 G/DL (ref 6.4–8.2)
RBC # BLD AUTO: 4.74 X10*6/UL (ref 4–5.2)
SODIUM SERPL-SCNC: 139 MMOL/L (ref 136–145)
WBC # BLD AUTO: 6 X10*3/UL (ref 4.4–11.3)

## 2024-05-25 PROCEDURE — 71046 X-RAY EXAM CHEST 2 VIEWS: CPT

## 2024-05-25 PROCEDURE — 2500000001 HC RX 250 WO HCPCS SELF ADMINISTERED DRUGS (ALT 637 FOR MEDICARE OP): Performed by: STUDENT IN AN ORGANIZED HEALTH CARE EDUCATION/TRAINING PROGRAM

## 2024-05-25 PROCEDURE — 99283 EMERGENCY DEPT VISIT LOW MDM: CPT | Mod: 25

## 2024-05-25 PROCEDURE — 93005 ELECTROCARDIOGRAM TRACING: CPT

## 2024-05-25 PROCEDURE — 84484 ASSAY OF TROPONIN QUANT: CPT | Performed by: STUDENT IN AN ORGANIZED HEALTH CARE EDUCATION/TRAINING PROGRAM

## 2024-05-25 PROCEDURE — 83735 ASSAY OF MAGNESIUM: CPT | Performed by: STUDENT IN AN ORGANIZED HEALTH CARE EDUCATION/TRAINING PROGRAM

## 2024-05-25 PROCEDURE — 80053 COMPREHEN METABOLIC PANEL: CPT | Performed by: STUDENT IN AN ORGANIZED HEALTH CARE EDUCATION/TRAINING PROGRAM

## 2024-05-25 PROCEDURE — 83690 ASSAY OF LIPASE: CPT | Performed by: STUDENT IN AN ORGANIZED HEALTH CARE EDUCATION/TRAINING PROGRAM

## 2024-05-25 PROCEDURE — 96360 HYDRATION IV INFUSION INIT: CPT

## 2024-05-25 PROCEDURE — 36415 COLL VENOUS BLD VENIPUNCTURE: CPT | Performed by: STUDENT IN AN ORGANIZED HEALTH CARE EDUCATION/TRAINING PROGRAM

## 2024-05-25 PROCEDURE — 71046 X-RAY EXAM CHEST 2 VIEWS: CPT | Mod: FOREIGN READ | Performed by: RADIOLOGY

## 2024-05-25 PROCEDURE — 85025 COMPLETE CBC W/AUTO DIFF WBC: CPT | Performed by: STUDENT IN AN ORGANIZED HEALTH CARE EDUCATION/TRAINING PROGRAM

## 2024-05-25 PROCEDURE — 2500000004 HC RX 250 GENERAL PHARMACY W/ HCPCS (ALT 636 FOR OP/ED): Performed by: STUDENT IN AN ORGANIZED HEALTH CARE EDUCATION/TRAINING PROGRAM

## 2024-05-25 RX ORDER — SUMATRIPTAN SUCCINATE 25 MG/1
50 TABLET ORAL EVERY 2 HOUR PRN
Status: DISCONTINUED | OUTPATIENT
Start: 2024-05-25 | End: 2024-05-25 | Stop reason: HOSPADM

## 2024-05-25 RX ADMIN — SUMATRIPTAN SUCCINATE 50 MG: 25 TABLET ORAL at 19:07

## 2024-05-25 RX ADMIN — SODIUM CHLORIDE, POTASSIUM CHLORIDE, SODIUM LACTATE AND CALCIUM CHLORIDE 1000 ML: 600; 310; 30; 20 INJECTION, SOLUTION INTRAVENOUS at 15:56

## 2024-05-25 ASSESSMENT — COLUMBIA-SUICIDE SEVERITY RATING SCALE - C-SSRS
1. IN THE PAST MONTH, HAVE YOU WISHED YOU WERE DEAD OR WISHED YOU COULD GO TO SLEEP AND NOT WAKE UP?: NO
6. HAVE YOU EVER DONE ANYTHING, STARTED TO DO ANYTHING, OR PREPARED TO DO ANYTHING TO END YOUR LIFE?: NO
2. HAVE YOU ACTUALLY HAD ANY THOUGHTS OF KILLING YOURSELF?: NO

## 2024-05-25 ASSESSMENT — LIFESTYLE VARIABLES
HAVE YOU EVER FELT YOU SHOULD CUT DOWN ON YOUR DRINKING: NO
TOTAL SCORE: 0
EVER FELT BAD OR GUILTY ABOUT YOUR DRINKING: NO
EVER HAD A DRINK FIRST THING IN THE MORNING TO STEADY YOUR NERVES TO GET RID OF A HANGOVER: NO
HAVE PEOPLE ANNOYED YOU BY CRITICIZING YOUR DRINKING: NO

## 2024-05-25 ASSESSMENT — PAIN DESCRIPTION - PAIN TYPE: TYPE: ACUTE PAIN

## 2024-05-25 ASSESSMENT — PAIN DESCRIPTION - LOCATION: LOCATION: CHEST

## 2024-05-25 ASSESSMENT — PAIN - FUNCTIONAL ASSESSMENT: PAIN_FUNCTIONAL_ASSESSMENT: 0-10

## 2024-05-25 ASSESSMENT — PAIN SCALES - GENERAL: PAINLEVEL_OUTOF10: 0 - NO PAIN
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
## 2024-05-28 LAB
ATRIAL RATE: 63 BPM
P AXIS: 8 DEGREES
PR INTERVAL: 174 MS
Q ONSET: 249 MS
QRS COUNT: 10 BEATS
QRS DURATION: 93 MS
QT INTERVAL: 419 MS
QTC CALCULATION(BAZETT): 426 MS
QTC FREDERICIA: 423 MS
R AXIS: 6 DEGREES
T AXIS: 32 DEGREES
T OFFSET: 459 MS
VENTRICULAR RATE: 62 BPM

## 2024-05-29 DIAGNOSIS — G43.909 MIGRAINE WITHOUT STATUS MIGRAINOSUS, NOT INTRACTABLE, UNSPECIFIED MIGRAINE TYPE: ICD-10-CM

## 2024-05-29 RX ORDER — SUMATRIPTAN 50 MG/1
50 TABLET, FILM COATED ORAL ONCE AS NEEDED
Qty: 9 TABLET | Refills: 5 | Status: SHIPPED | OUTPATIENT
Start: 2024-05-29 | End: 2025-05-29

## 2024-06-01 PROBLEM — B96.89 ACUTE BACTERIAL SINUSITIS: Status: RESOLVED | Noted: 2024-05-13 | Resolved: 2024-06-01

## 2024-06-01 PROBLEM — Z98.84 HISTORY OF GASTRIC BYPASS: Status: RESOLVED | Noted: 2023-09-17 | Resolved: 2024-06-01

## 2024-06-01 PROBLEM — Z00.00 HEALTHCARE MAINTENANCE: Status: ACTIVE | Noted: 2024-06-01

## 2024-06-01 PROBLEM — E55.9 VITAMIN D DEFICIENCY: Status: ACTIVE | Noted: 2024-06-01

## 2024-06-01 PROBLEM — R79.89 ABNORMAL CBC: Status: ACTIVE | Noted: 2024-06-01

## 2024-06-01 PROBLEM — M79.605 PAIN OF LEFT LOWER EXTREMITY: Status: ACTIVE | Noted: 2024-06-01

## 2024-06-01 PROBLEM — J01.90 ACUTE BACTERIAL SINUSITIS: Status: RESOLVED | Noted: 2024-05-13 | Resolved: 2024-06-01

## 2024-06-01 PROBLEM — L23.89 ALLERGIC CONTACT DERMATITIS DUE TO OTHER AGENTS: Status: RESOLVED | Noted: 2023-09-17 | Resolved: 2024-06-01

## 2024-06-01 PROBLEM — R73.09 ELEVATED GLUCOSE: Status: ACTIVE | Noted: 2024-06-01

## 2024-06-01 PROBLEM — Z13.29 SCREENING FOR THYROID DISORDER: Status: ACTIVE | Noted: 2024-06-01

## 2024-06-01 ASSESSMENT — ENCOUNTER SYMPTOMS
BACK PAIN: 1
PALPITATIONS: 0
ACTIVITY CHANGE: 1
ARTHRALGIAS: 1

## 2024-06-02 NOTE — ASSESSMENT & PLAN NOTE
Will check labs - no extended release medications.   Encouraged patient to take weight loss vitamins with consistency.

## 2024-11-11 NOTE — TELEPHONE ENCOUNTER
----- Message from Betsy Ojeda, DO sent at 4/1/2024 11:43 AM EDT -----  Can you call and let the patient know that her uric acid level was actually normal so I do not think the issue that she was having was gout.  It may be more tendinitis related.  Her blood sugar was actually fairly elevated as well.  I recommend trying to decrease her carbohydrate intake and I will send a message to Agueda as well.      
Patient informed of lab results from 3/28/24, verbalized understanding  
no

## 2024-11-14 ENCOUNTER — APPOINTMENT (OUTPATIENT)
Dept: PRIMARY CARE | Facility: CLINIC | Age: 59
End: 2024-11-14
Payer: COMMERCIAL

## 2024-12-05 ENCOUNTER — OFFICE VISIT (OUTPATIENT)
Dept: PRIMARY CARE | Facility: CLINIC | Age: 59
End: 2024-12-05

## 2024-12-05 VITALS
WEIGHT: 173 LBS | DIASTOLIC BLOOD PRESSURE: 80 MMHG | SYSTOLIC BLOOD PRESSURE: 112 MMHG | BODY MASS INDEX: 27.8 KG/M2 | HEIGHT: 66 IN | TEMPERATURE: 97.8 F

## 2024-12-05 DIAGNOSIS — Z13.1 SCREENING FOR DIABETES MELLITUS: ICD-10-CM

## 2024-12-05 DIAGNOSIS — Z13.29 SCREENING FOR THYROID DISORDER: ICD-10-CM

## 2024-12-05 DIAGNOSIS — Z00.00 HEALTHCARE MAINTENANCE: ICD-10-CM

## 2024-12-05 DIAGNOSIS — Z13.21 ENCOUNTER FOR VITAMIN DEFICIENCY SCREENING: ICD-10-CM

## 2024-12-05 DIAGNOSIS — Z12.31 ENCOUNTER FOR SCREENING MAMMOGRAM FOR MALIGNANT NEOPLASM OF BREAST: Primary | ICD-10-CM

## 2024-12-05 DIAGNOSIS — Z78.0 POSTMENOPAUSAL: ICD-10-CM

## 2024-12-05 DIAGNOSIS — F41.8 SITUATIONAL ANXIETY: ICD-10-CM

## 2024-12-05 PROBLEM — K76.0 STEATOSIS OF LIVER: Status: ACTIVE | Noted: 2024-12-05

## 2024-12-05 PROBLEM — F41.1 ANXIETY, GENERALIZED: Status: ACTIVE | Noted: 2024-12-05

## 2024-12-05 PROBLEM — E66.812 CLASS 2 OBESITY: Status: ACTIVE | Noted: 2024-12-05

## 2024-12-05 PROBLEM — M51.26 DISPLACEMENT OF LUMBAR INTERVERTEBRAL DISC WITHOUT MYELOPATHY: Status: ACTIVE | Noted: 2024-12-05

## 2024-12-05 PROBLEM — F39 MOOD DISORDER (CMS-HCC): Status: ACTIVE | Noted: 2024-12-05

## 2024-12-05 PROBLEM — M16.12 PRIMARY OSTEOARTHRITIS OF LEFT HIP: Status: ACTIVE | Noted: 2024-12-05

## 2024-12-05 PROBLEM — I83.813 VARICOSE VEINS OF BOTH LOWER EXTREMITIES WITH PAIN: Status: ACTIVE | Noted: 2024-12-05

## 2024-12-05 PROBLEM — M50.00 INTERVERTEBRAL DISC DISORDER OF CERVICAL REGION WITH MYELOPATHY: Status: ACTIVE | Noted: 2024-12-05

## 2024-12-05 PROBLEM — I10 ESSENTIAL HYPERTENSION: Status: ACTIVE | Noted: 2024-12-05

## 2024-12-05 PROBLEM — E66.9 OBESITY (BMI 30-39.9): Status: ACTIVE | Noted: 2024-12-05

## 2024-12-05 PROBLEM — F33.0 DEPRESSION, MAJOR, RECURRENT, MILD (CMS-HCC): Status: ACTIVE | Noted: 2024-12-05

## 2024-12-05 PROCEDURE — 99396 PREV VISIT EST AGE 40-64: CPT | Performed by: NURSE PRACTITIONER

## 2024-12-05 PROCEDURE — 3074F SYST BP LT 130 MM HG: CPT | Performed by: NURSE PRACTITIONER

## 2024-12-05 PROCEDURE — 3008F BODY MASS INDEX DOCD: CPT | Performed by: NURSE PRACTITIONER

## 2024-12-05 PROCEDURE — 99213 OFFICE O/P EST LOW 20 MIN: CPT | Performed by: NURSE PRACTITIONER

## 2024-12-05 PROCEDURE — 3079F DIAST BP 80-89 MM HG: CPT | Performed by: NURSE PRACTITIONER

## 2024-12-05 RX ORDER — BUPROPION HYDROCHLORIDE 150 MG/1
150 TABLET, EXTENDED RELEASE ORAL 2 TIMES DAILY
Qty: 60 TABLET | Refills: 1 | Status: SHIPPED | OUTPATIENT
Start: 2024-12-05 | End: 2025-12-05

## 2024-12-05 RX ORDER — BUTYROSPERMUM PARKII(SHEA BUTTER), SIMMONDSIA CHINENSIS (JOJOBA) SEED OIL, ALOE BARBADENSIS LEAF EXTRACT .01; 1; 3.5 G/100G; G/100G; G/100G
250 LIQUID TOPICAL 2 TIMES DAILY
COMMUNITY

## 2024-12-05 RX ORDER — BUSPIRONE HYDROCHLORIDE 5 MG/1
5 TABLET ORAL 2 TIMES DAILY PRN
Qty: 60 TABLET | Refills: 0 | Status: SHIPPED | OUTPATIENT
Start: 2024-12-05 | End: 2025-12-05

## 2024-12-05 ASSESSMENT — PATIENT HEALTH QUESTIONNAIRE - PHQ9
SUM OF ALL RESPONSES TO PHQ9 QUESTIONS 1 AND 2: 0
1. LITTLE INTEREST OR PLEASURE IN DOING THINGS: NOT AT ALL
2. FEELING DOWN, DEPRESSED OR HOPELESS: NOT AT ALL

## 2024-12-05 NOTE — PATIENT INSTRUCTIONS
Good to see you today  Be sure to take some time for yourself.  Labs:  Fast 8 hours/hydrate well/NO SUPPLEMENTS the day of the test.  Lab hours in [place]:  6:30-4 M-F   Let me know about the Sertraline dose.  Change to the Buproprion SR one pill twice daily (instead of one per day)   TRIAL of Buspirone one -two pills twice daily as needed for situational anxiety.  Follow up based on labs.        Ways to Help Prevent Falls at Home    Quick Tips   ? Ask for help if you need it. Most people want to help!   ? Get up slowly after sitting or laying down   ? Wear a medical alert device or keep cell phone in your pocket   ? Use night lights, especially areas near a bathroom   ? Keep the items you use often within reach on a small stool or end table   ? Use an assistive device such as walker or cane, as directed by provider/physical therapy   ? Use a non-slip mat and grab bars in your bathroom. Look for home health sections for best options     Other Areas to Focus On   ? Exercise and nutrition: Regular exercise or taking a falls prevention class are great ways improve strength and balance. Don’t forget to stay hydrated and bring a snack!   ? Medicine side effects: Some medicines can make you sleepy or dizzy, which could cause a fall. Ask your healthcare provider about the side effects your medicines could cause. Be sure to let them know if you take any vitamins or supplements as well.   ? Tripping hazards: Remove items you could trip on, such as loose mats, rugs, cords, and clutter. Wear closed toe shoes with rubber soles.   ? Health and wellness: Get regular checkups with your healthcare provider, plus routine vision and hearing screenings. Talk with your healthcare provider about:   o Your medicines and the possible side effects - bring them in a bag if that is easier!   o Problems with balance or feeling dizzy   o Ways to promote bone health, such as Vitamin D and calcium supplements   o Questions or concerns about  falling     *Ask your healthcare team if you have questions     ©[facility], 2022
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
## 2024-12-19 ENCOUNTER — APPOINTMENT (OUTPATIENT)
Dept: PRIMARY CARE | Facility: CLINIC | Age: 59
End: 2024-12-19

## 2024-12-20 ENCOUNTER — HOSPITAL ENCOUNTER (OUTPATIENT)
Dept: RADIOLOGY | Facility: CLINIC | Age: 59
Discharge: HOME | End: 2024-12-20
Payer: COMMERCIAL

## 2024-12-20 DIAGNOSIS — Z78.0 POSTMENOPAUSAL: ICD-10-CM

## 2024-12-20 PROCEDURE — 77080 DXA BONE DENSITY AXIAL: CPT

## 2025-01-04 ENCOUNTER — HOSPITAL ENCOUNTER (OUTPATIENT)
Dept: RADIOLOGY | Facility: HOSPITAL | Age: 60
Discharge: HOME | End: 2025-01-04
Payer: COMMERCIAL

## 2025-01-04 VITALS — BODY MASS INDEX: 28.82 KG/M2 | WEIGHT: 173 LBS | HEIGHT: 65 IN

## 2025-01-04 DIAGNOSIS — Z12.31 ENCOUNTER FOR SCREENING MAMMOGRAM FOR MALIGNANT NEOPLASM OF BREAST: ICD-10-CM

## 2025-01-04 PROCEDURE — 77063 BREAST TOMOSYNTHESIS BI: CPT

## 2025-01-04 PROCEDURE — 77067 SCR MAMMO BI INCL CAD: CPT

## 2025-01-07 ENCOUNTER — TELEPHONE (OUTPATIENT)
Dept: PRIMARY CARE | Facility: CLINIC | Age: 60
End: 2025-01-07

## 2025-01-07 NOTE — TELEPHONE ENCOUNTER
----- Message from Agueda Edgar sent at 1/7/2025  6:22 AM EST -----  Bone density shows some definite thinning (osteoporosis).  I would like to see her to review this

## 2025-01-28 DIAGNOSIS — M25.552 LEFT HIP PAIN: Primary | ICD-10-CM

## 2025-01-31 PROBLEM — Z78.0 POSTMENOPAUSAL: Status: ACTIVE | Noted: 2025-01-31

## 2025-01-31 PROBLEM — Z12.31 ENCOUNTER FOR SCREENING MAMMOGRAM FOR MALIGNANT NEOPLASM OF BREAST: Status: ACTIVE | Noted: 2024-06-01

## 2025-01-31 PROBLEM — Z13.1 SCREENING FOR DIABETES MELLITUS: Status: ACTIVE | Noted: 2024-06-01

## 2025-01-31 PROBLEM — F41.8 SITUATIONAL ANXIETY: Status: ACTIVE | Noted: 2023-09-17

## 2025-01-31 PROBLEM — Z13.21 ENCOUNTER FOR VITAMIN DEFICIENCY SCREENING: Status: ACTIVE | Noted: 2024-06-01

## 2025-01-31 ASSESSMENT — ENCOUNTER SYMPTOMS
ACTIVITY CHANGE: 1
ARTHRALGIAS: 1
NERVOUS/ANXIOUS: 1
DYSPHORIC MOOD: 1
SLEEP DISTURBANCE: 1

## 2025-01-31 NOTE — ASSESSMENT & PLAN NOTE
Orders:    buPROPion SR (Wellbutrin SR) 150 mg 12 hr tablet; Take 1 tablet (150 mg) by mouth 2 times a day. Do not crush, chew, or split.    busPIRone (Buspar) 5 mg tablet; Take 1 tablet (5 mg) by mouth 2 times a day as needed (situational anxiety).

## 2025-02-03 ENCOUNTER — HOSPITAL ENCOUNTER (OUTPATIENT)
Dept: RADIOLOGY | Facility: CLINIC | Age: 60
Discharge: HOME | End: 2025-02-03
Payer: COMMERCIAL

## 2025-02-03 ENCOUNTER — APPOINTMENT (OUTPATIENT)
Facility: CLINIC | Age: 60
End: 2025-02-03
Payer: COMMERCIAL

## 2025-02-03 VITALS — HEIGHT: 65 IN | BODY MASS INDEX: 27.97 KG/M2 | WEIGHT: 167.9 LBS

## 2025-02-03 DIAGNOSIS — M70.62 TROCHANTERIC BURSITIS OF LEFT HIP: Primary | ICD-10-CM

## 2025-02-03 DIAGNOSIS — M25.552 LEFT HIP PAIN: ICD-10-CM

## 2025-02-03 PROCEDURE — 73502 X-RAY EXAM HIP UNI 2-3 VIEWS: CPT | Mod: LT

## 2025-02-03 PROCEDURE — 20610 DRAIN/INJ JOINT/BURSA W/O US: CPT | Performed by: ORTHOPAEDIC SURGERY

## 2025-02-03 PROCEDURE — 3008F BODY MASS INDEX DOCD: CPT | Performed by: ORTHOPAEDIC SURGERY

## 2025-02-03 PROCEDURE — 99204 OFFICE O/P NEW MOD 45 MIN: CPT | Performed by: ORTHOPAEDIC SURGERY

## 2025-02-03 RX ORDER — TRIAMCINOLONE ACETONIDE 40 MG/ML
80 INJECTION, SUSPENSION INTRA-ARTICULAR; INTRAMUSCULAR
Status: COMPLETED | OUTPATIENT
Start: 2025-02-03 | End: 2025-02-03

## 2025-02-03 RX ADMIN — TRIAMCINOLONE ACETONIDE 80 MG: 40 INJECTION, SUSPENSION INTRA-ARTICULAR; INTRAMUSCULAR at 08:37

## 2025-02-03 ASSESSMENT — PAIN - FUNCTIONAL ASSESSMENT: PAIN_FUNCTIONAL_ASSESSMENT: 0-10

## 2025-02-03 ASSESSMENT — PAIN SCALES - GENERAL: PAINLEVEL_OUTOF10: 6

## 2025-02-03 NOTE — LETTER
February 3, 2025     Patient: Ida Diego   YOB: 1965   Date of Visit: 2/3/2025       To Whom It May Concern:    Ida Diego was seen in my clinic on 2/3/2025 at 8:15 am. Please excuse Ida for her absence from work on this day to make the appointment.    If you have any questions or concerns, please don't hesitate to call.         Sincerely,         Pearl Gonsalez,         CC: No Recipients

## 2025-02-03 NOTE — LETTER
February 3, 2025     SARAH Arenas  [address]  [facility] [address]  [facility] [address]    Patient: Ida Diego   YOB: 1965   Date of Visit: 2/3/2025       Dear SARAH Oneil:    Thank you for referring Ida Diego to me for evaluation. Below are my notes for this consultation.  If you have questions, please do not hesitate to call me. I look forward to following your patient along with you.       Sincerely,     Pearl Gonsalez, DO      CC: No Recipients  ______________________________________________________________________________________    PRIMARY CARE PHYSICIAN: SARAH Arenas  REFERRING PROVIDER: No referring provider defined for this encounter.     CONSULT ORTHOPAEDIC: Hip Evaluation        ASSESSMENT & PLAN    IMPRESSION:  Left hip trochanteric bursitis    PLAN:  Discussed with patient findings above and reviewed her current x-rays with her.  She has minimal arthritic changes and most of her symptoms would be secondary to hip bursitis.  She has had good relief with the corticosteroid injection the past would like to try that today.  We additionally discussed treatment with activity modification, weight control and an exercise program to help her symptoms are she is understanding of.  Provided with a home exercise guide today.  See below for injection details.  May repeat every 3 to 4 months.    L Inj/Asp: R greater trochanteric bursa on 2/3/2025 8:37 AM  Indications: pain  Details: 25 G needle, lateral approach  Medications: 80 mg triamcinolone acetonide 40 mg/mL  Outcome: tolerated well, no immediate complications  Procedure, treatment alternatives, risks and benefits explained, specific risks discussed. Consent was given by the patient.             SUBJECTIVE  CHIEF COMPLAINT: left hip pain     HPI: Ida Diego is a 59 y.o. patient. Ida Diego has had progressive problems with the left hip over the past 1 year. They do not report any  trauma. They do not report any constant or progressive numbness or tingling in their legs. Their symptoms are interfering with activities which include walking, working . Patient states she received a cortisone injection about 5 years. Pain located along the side of the hip. Denies groin pain    FUNCTIONAL STATUS: occasionally limited.  AMBULATORY STATUS: Independent community ambulation without devices  PREVIOUS TREATMENTS: Cortisone injection five years ago with good improvement  HISTORY OF SURGERY ON AFFECTED HIP(S): No   BACK PAIN REPORTED: Yes       REVIEW OF SYSTEMS  Constitutional: See HPI for pain assessment, No significant weight loss, recent trauma  Cardiovascular: No chest pain, shortness of breath  Respiratory: No difficulty breathing, cough  Gastrointestinal: No nausea, vomiting, diarrhea, constipation  Musculoskeletal: Noted in HPI, positive for pain, restricted motion, stiffness  Integumentary: No rashes, easy bruising, redness   Neurological: no numbness or tingling in extremities, no gait disturbances   Psychiatric: No mood changes, memory changes, social issues  Heme/Lymph: no excessive swelling, easy bruising, excessive bleeding  ENT: No hearing changes  Eyes: No vision changes    Past Medical History:   Diagnosis Date   • Acute candidiasis of vulva and vagina 02/14/2015    Candida vaginitis   • Adjustment disorder with depressed mood 12/14/2016    Situational depression   • Allergic contact dermatitis due to other agents 09/17/2023   • Body mass index (BMI) 37.0-37.9, adult 01/14/2022    Body mass index (BMI) of 37.0 to 37.9 in adult   • Carbuncle, unspecified 08/25/2015    Carbuncle   • Contusion of unspecified front wall of thorax, initial encounter 11/18/2015    Contusion of chest   • Contusion of unspecified lower leg, initial encounter 12/14/2016    Contusion of tibia   • Cramp and spasm 05/19/2015    Muscle cramps   • History of gastric bypass 09/17/2023   • Hordeolum externum unspecified  eye, unspecified eyelid 02/14/2015    Stye   • Noninfective gastroenteritis and colitis, unspecified 08/28/2015    Noninfectious gastroenteritis   • Pain in left hip 07/13/2015    Left hip pain   • Pain in unspecified knee 12/08/2013    Joint pain, knee   • Patellofemoral disorders, unspecified knee 06/10/2014    Patellofemoral syndrome   • Personal history of diseases of the blood and blood-forming organs and certain disorders involving the immune mechanism 08/25/2015    History of anemia   • Personal history of other diseases of the musculoskeletal system and connective tissue 10/28/2014    History of joint pain   • Personal history of other diseases of the nervous system and sense organs 02/14/2015    History of bacterial conjunctivitis   • Personal history of other diseases of the respiratory system 03/19/2017    History of acute bronchitis   • Strain of muscle, fascia and tendon of lower back, initial encounter 05/19/2015    Lumbar strain   • Trochanteric bursitis, left hip 07/01/2015    Trochanteric bursitis of left hip   • Urinary tract infection, site not specified 08/16/2017    Acute UTI        Allergies   Allergen Reactions   • Lanolin Hives   • Procaine Dizziness and Other     tinnitis        Past Surgical History:   Procedure Laterality Date   • OTHER SURGICAL HISTORY  10/17/2022    Yared-en-Y gastric bypass   • UPPER ENDOSCOPY W/ SCLEROTHERAPY          Family History   Problem Relation Name Age of Onset   • Other (bladder cancer) Mother     • Lung cancer Mother     • Melanoma Mother     • Prostate cancer Father     • Other (heart problems) Father     • Depression Sister Connie    • Arthritis Sister Connie    • Depression Sister Rose Mary    • Depression Sister Carlie    • Heart attack Sister Carlie    • Other (htn) Brother     • Hyperlipidemia Brother     • Diabetes Brother     • No Known Problems Daughter Crystal    • Leukemia Daughter Aleida 26   • Migraines Son Jim         Social History     Socioeconomic  History   • Marital status:      Spouse name: Not on file   • Number of children: Not on file   • Years of education: Not on file   • Highest education level: Not on file   Occupational History   • Not on file   Tobacco Use   • Smoking status: Never   • Smokeless tobacco: Never   Vaping Use   • Vaping status: Never Used   Substance and Sexual Activity   • Alcohol use: Yes   • Drug use: Never   • Sexual activity: Not on file   Other Topics Concern   • Not on file   Social History Narrative   • Not on file     Social Drivers of Health     Financial Resource Strain: Not on file   Food Insecurity: Not on file   Transportation Needs: Not on file   Physical Activity: Not on file   Stress: Not on file   Social Connections: Not on file   Intimate Partner Violence: Not on file   Housing Stability: Not on file        CURRENT MEDICATIONS:   Current Outpatient Medications   Medication Sig Dispense Refill   • allopurinol (Zyloprim) 100 mg tablet Take 1 tablet (100 mg) by mouth once daily. 90 tablet 0   • betamethasone dipropionate (Diprosone) 0.05 % lotion Apply topically 2 times a day. 15 g 1   • buPROPion SR (Wellbutrin SR) 150 mg 12 hr tablet Take 1 tablet (150 mg) by mouth 2 times a day. Do not crush, chew, or split. 60 tablet 1   • busPIRone (Buspar) 5 mg tablet Take 1 tablet (5 mg) by mouth 2 times a day as needed (situational anxiety). 60 tablet 0   • diclofenac sodium (Voltaren) 1 % gel Apply 4.5 inches (4 g) topically 4 times a day. 100 g 1   • fluticasone (Flonase) 50 mcg/actuation nasal spray Administer 2 sprays into affected nostril(s) once daily.     • HAIR, SKIN AND NAILS, BIOTIN, ORAL Take by mouth.     • MICHELLE, BULK, MISC 1,500 mg once daily.     • multivit-min/ferrous fumarate (MULTI VITAMIN ORAL) Take 1 tablet by mouth once daily.     • psyllium husk/aspartame (METAMUCIL MULTIHEALTH FIBER ORAL) Take by mouth.     • saccharomyces boulardii (Florastor) 250 mg capsule Take 1 capsule (250 mg) by mouth 2 times  "a day.     • SUMAtriptan (Imitrex) 50 mg tablet Take 1 tablet (50 mg) by mouth 1 time if needed for migraine. May repeat after 2 hours. 9 tablet 5     No current facility-administered medications for this visit.        OBJECTIVE    PHYSICAL EXAM      3/28/2024     8:45 AM 5/13/2024    11:29 AM 5/25/2024     3:15 PM 5/25/2024     4:00 PM 5/25/2024     5:00 PM 12/5/2024     1:39 PM 1/4/2025     1:08 PM   Vitals   Systolic 116 120 127 117 103 112    Diastolic 80 84 74 68 79 80    BP Location Left arm  Left arm       Heart Rate 60  71 56 66     Temp 36.3 °C (97.4 °F) 36.6 °C (97.8 °F) 36.4 °C (97.5 °F)   36.6 °C (97.8 °F)    Resp   16 16 14     Height  1.702 m (5' 7\") 1.702 m (5' 7\")   1.664 m (5' 5.5\") 1.651 m (5' 5\")   Weight (lb) 168.6 173 170   173 173   BMI 26.41 kg/m2 27.1 kg/m2 26.63 kg/m2   28.35 kg/m2 28.79 kg/m2   BSA (m2) 1.9 m2 1.93 m2 1.91 m2   1.9 m2 1.9 m2   Visit Report Report Report    Report       There is no height or weight on file to calculate BMI.    GENERAL: A/Ox3, NAD. Appears healthy, well nourished  PSYCHIATRIC: Mood stable, appropriate memory recall  EYES: EOM intact, no scleral icterus  CARDIAC: regular rate  LUNGS: Breathing non-labored  SKIN: no erythema, rashes, or ecchymoses     MUSCULOSKELETAL:  Laterality: left Hip Exam  - ROM, Extension: full, no flexion contracture  - Strength: Abduction 5/5, Flexion 5/5. Abductor pain against resistance: No   - Palpation:  TTP along greater trochanter, posterolateral border  - Log roll/IR exam: non painful, good IR  - Straight leg raise: negative  - EHL/PF/DF motor intact  - Gait: normal  - Special Tests: negative Patti    NEUROVASCULAR:  - Neurovascular Status: sensation intact to light touch distally  - Capillary refill brisk at extremities, Bilateral dorsalis pedis pulse 2+  \          IMAGING:  Multiple views of the affected left hip(s) demonstrate: minimal arthritis changes.   X-rays were personally reviewed and interpreted by me.  Radiology " reports were reviewed by me as well, if readily available at the time.        Pearl Gonsalez DO  Attending Surgeon  Joint Replacement and Adult Reconstructive Surgery  [place], [place]

## 2025-02-03 NOTE — PROGRESS NOTES
PRIMARY CARE PHYSICIAN: SARAH Arenas  REFERRING PROVIDER: No referring provider defined for this encounter.     CONSULT ORTHOPAEDIC: Hip Evaluation        ASSESSMENT & PLAN    IMPRESSION:  Left hip trochanteric bursitis    PLAN:  Discussed with patient findings above and reviewed her current x-rays with her.  She has minimal arthritic changes and most of her symptoms would be secondary to hip bursitis.  She has had good relief with the corticosteroid injection the past would like to try that today.  We additionally discussed treatment with activity modification, weight control and an exercise program to help her symptoms are she is understanding of.  Provided with a home exercise guide today.  See below for injection details.  May repeat every 3 to 4 months.    L Inj/Asp: R greater trochanteric bursa on 2/3/2025 8:37 AM  Indications: pain  Details: 25 G needle, lateral approach  Medications: 80 mg triamcinolone acetonide 40 mg/mL  Outcome: tolerated well, no immediate complications  Procedure, treatment alternatives, risks and benefits explained, specific risks discussed. Consent was given by the patient.             SUBJECTIVE  CHIEF COMPLAINT: left hip pain     HPI: Ida Diego is a 59 y.o. patient. Ida Diego has had progressive problems with the left hip over the past 1 year. They do not report any trauma. They do not report any constant or progressive numbness or tingling in their legs. Their symptoms are interfering with activities which include walking, working . Patient states she received a cortisone injection about 5 years. Pain located along the side of the hip. Denies groin pain    FUNCTIONAL STATUS: occasionally limited.  AMBULATORY STATUS: Independent community ambulation without devices  PREVIOUS TREATMENTS: Cortisone injection five years ago with good improvement  HISTORY OF SURGERY ON AFFECTED HIP(S): No   BACK PAIN REPORTED: Yes       REVIEW OF SYSTEMS  Constitutional: See HPI  for pain assessment, No significant weight loss, recent trauma  Cardiovascular: No chest pain, shortness of breath  Respiratory: No difficulty breathing, cough  Gastrointestinal: No nausea, vomiting, diarrhea, constipation  Musculoskeletal: Noted in HPI, positive for pain, restricted motion, stiffness  Integumentary: No rashes, easy bruising, redness   Neurological: no numbness or tingling in extremities, no gait disturbances   Psychiatric: No mood changes, memory changes, social issues  Heme/Lymph: no excessive swelling, easy bruising, excessive bleeding  ENT: No hearing changes  Eyes: No vision changes    Past Medical History:   Diagnosis Date    Acute candidiasis of vulva and vagina 02/14/2015    Candida vaginitis    Adjustment disorder with depressed mood 12/14/2016    Situational depression    Allergic contact dermatitis due to other agents 09/17/2023    Body mass index (BMI) 37.0-37.9, adult 01/14/2022    Body mass index (BMI) of 37.0 to 37.9 in adult    Carbuncle, unspecified 08/25/2015    Carbuncle    Contusion of unspecified front wall of thorax, initial encounter 11/18/2015    Contusion of chest    Contusion of unspecified lower leg, initial encounter 12/14/2016    Contusion of tibia    Cramp and spasm 05/19/2015    Muscle cramps    History of gastric bypass 09/17/2023    Hordeolum externum unspecified eye, unspecified eyelid 02/14/2015    Stye    Noninfective gastroenteritis and colitis, unspecified 08/28/2015    Noninfectious gastroenteritis    Pain in left hip 07/13/2015    Left hip pain    Pain in unspecified knee 12/08/2013    Joint pain, knee    Patellofemoral disorders, unspecified knee 06/10/2014    Patellofemoral syndrome    Personal history of diseases of the blood and blood-forming organs and certain disorders involving the immune mechanism 08/25/2015    History of anemia    Personal history of other diseases of the musculoskeletal system and connective tissue 10/28/2014    History of joint pain     Personal history of other diseases of the nervous system and sense organs 02/14/2015    History of bacterial conjunctivitis    Personal history of other diseases of the respiratory system 03/19/2017    History of acute bronchitis    Strain of muscle, fascia and tendon of lower back, initial encounter 05/19/2015    Lumbar strain    Trochanteric bursitis, left hip 07/01/2015    Trochanteric bursitis of left hip    Urinary tract infection, site not specified 08/16/2017    Acute UTI        Allergies   Allergen Reactions    Lanolin Hives    Procaine Dizziness and Other     tinnitis        Past Surgical History:   Procedure Laterality Date    OTHER SURGICAL HISTORY  10/17/2022    Yared-en-Y gastric bypass    UPPER ENDOSCOPY W/ SCLEROTHERAPY          Family History   Problem Relation Name Age of Onset    Other (bladder cancer) Mother      Lung cancer Mother      Melanoma Mother      Prostate cancer Father      Other (heart problems) Father      Depression Sister Connie     Arthritis Sister Connie     Depression Sister Rose Mary     Depression Sister Carlie     Heart attack Sister Carlie     Other (htn) Brother      Hyperlipidemia Brother      Diabetes Brother      No Known Problems Daughter Crystal     Leukemia Daughter Aleida 26    Migraines Son Jim         Social History     Socioeconomic History    Marital status:      Spouse name: Not on file    Number of children: Not on file    Years of education: Not on file    Highest education level: Not on file   Occupational History    Not on file   Tobacco Use    Smoking status: Never    Smokeless tobacco: Never   Vaping Use    Vaping status: Never Used   Substance and Sexual Activity    Alcohol use: Yes    Drug use: Never    Sexual activity: Not on file   Other Topics Concern    Not on file   Social History Narrative    Not on file     Social Drivers of Health     Financial Resource Strain: Not on file   Food Insecurity: Not on file   Transportation Needs: Not on file    Physical Activity: Not on file   Stress: Not on file   Social Connections: Not on file   Intimate Partner Violence: Not on file   Housing Stability: Not on file        CURRENT MEDICATIONS:   Current Outpatient Medications   Medication Sig Dispense Refill    allopurinol (Zyloprim) 100 mg tablet Take 1 tablet (100 mg) by mouth once daily. 90 tablet 0    betamethasone dipropionate (Diprosone) 0.05 % lotion Apply topically 2 times a day. 15 g 1    buPROPion SR (Wellbutrin SR) 150 mg 12 hr tablet Take 1 tablet (150 mg) by mouth 2 times a day. Do not crush, chew, or split. 60 tablet 1    busPIRone (Buspar) 5 mg tablet Take 1 tablet (5 mg) by mouth 2 times a day as needed (situational anxiety). 60 tablet 0    diclofenac sodium (Voltaren) 1 % gel Apply 4.5 inches (4 g) topically 4 times a day. 100 g 1    fluticasone (Flonase) 50 mcg/actuation nasal spray Administer 2 sprays into affected nostril(s) once daily.      HAIR, SKIN AND NAILS, BIOTIN, ORAL Take by mouth.      MICHELLE, BULK, MISC 1,500 mg once daily.      multivit-min/ferrous fumarate (MULTI VITAMIN ORAL) Take 1 tablet by mouth once daily.      psyllium husk/aspartame (METAMUCIL MULTIHEALTH FIBER ORAL) Take by mouth.      saccharomyces boulardii (Florastor) 250 mg capsule Take 1 capsule (250 mg) by mouth 2 times a day.      SUMAtriptan (Imitrex) 50 mg tablet Take 1 tablet (50 mg) by mouth 1 time if needed for migraine. May repeat after 2 hours. 9 tablet 5     No current facility-administered medications for this visit.        OBJECTIVE    PHYSICAL EXAM      3/28/2024     8:45 AM 5/13/2024    11:29 AM 5/25/2024     3:15 PM 5/25/2024     4:00 PM 5/25/2024     5:00 PM 12/5/2024     1:39 PM 1/4/2025     1:08 PM   Vitals   Systolic 116 120 127 117 103 112    Diastolic 80 84 74 68 79 80    BP Location Left arm  Left arm       Heart Rate 60  71 56 66     Temp 36.3 °C (97.4 °F) 36.6 °C (97.8 °F) 36.4 °C (97.5 °F)   36.6 °C (97.8 °F)    Resp   16 16 14     Height  1.702 m  "(5' 7\") 1.702 m (5' 7\")   1.664 m (5' 5.5\") 1.651 m (5' 5\")   Weight (lb) 168.6 173 170   173 173   BMI 26.41 kg/m2 27.1 kg/m2 26.63 kg/m2   28.35 kg/m2 28.79 kg/m2   BSA (m2) 1.9 m2 1.93 m2 1.91 m2   1.9 m2 1.9 m2   Visit Report Report Report    Report       There is no height or weight on file to calculate BMI.    GENERAL: A/Ox3, NAD. Appears healthy, well nourished  PSYCHIATRIC: Mood stable, appropriate memory recall  EYES: EOM intact, no scleral icterus  CARDIAC: regular rate  LUNGS: Breathing non-labored  SKIN: no erythema, rashes, or ecchymoses     MUSCULOSKELETAL:  Laterality: left Hip Exam  - ROM, Extension: full, no flexion contracture  - Strength: Abduction 5/5, Flexion 5/5. Abductor pain against resistance: No   - Palpation:  TTP along greater trochanter, posterolateral border  - Log roll/IR exam: non painful, good IR  - Straight leg raise: negative  - EHL/PF/DF motor intact  - Gait: normal  - Special Tests: negative Patti    NEUROVASCULAR:  - Neurovascular Status: sensation intact to light touch distally  - Capillary refill brisk at extremities, Bilateral dorsalis pedis pulse 2+  \          IMAGING:  Multiple views of the affected left hip(s) demonstrate: minimal arthritis changes.   X-rays were personally reviewed and interpreted by me.  Radiology reports were reviewed by me as well, if readily available at the time.        Pearl Gonsalez DO  Attending Surgeon  Joint Replacement and Adult Reconstructive Surgery  [place], [place]                         "

## 2025-02-03 NOTE — PATIENT INSTRUCTIONS
BMI was above normal measurement. Current weight: 76.2 kg (167 lb 14.4 oz)  Weight change since last visit (-) denotes wt loss -5.1 lbs   Weight loss needed to achieve BMI 25: 18 Lbs  Weight loss needed to achieve BMI 30: -12 Lbs  Advised to Increase physical activity.

## 2025-02-05 ENCOUNTER — APPOINTMENT (OUTPATIENT)
Dept: PRIMARY CARE | Facility: CLINIC | Age: 60
End: 2025-02-05

## 2025-02-05 VITALS
TEMPERATURE: 98 F | BODY MASS INDEX: 27.96 KG/M2 | SYSTOLIC BLOOD PRESSURE: 118 MMHG | WEIGHT: 168 LBS | DIASTOLIC BLOOD PRESSURE: 80 MMHG

## 2025-02-05 DIAGNOSIS — M81.0 AGE-RELATED OSTEOPOROSIS WITHOUT CURRENT PATHOLOGICAL FRACTURE: Primary | ICD-10-CM

## 2025-02-05 DIAGNOSIS — Z98.84 HISTORY OF BARIATRIC SURGERY: ICD-10-CM

## 2025-02-05 DIAGNOSIS — F41.8 SITUATIONAL ANXIETY: ICD-10-CM

## 2025-02-05 PROCEDURE — 3079F DIAST BP 80-89 MM HG: CPT | Performed by: NURSE PRACTITIONER

## 2025-02-05 PROCEDURE — 99214 OFFICE O/P EST MOD 30 MIN: CPT | Performed by: NURSE PRACTITIONER

## 2025-02-05 PROCEDURE — 1036F TOBACCO NON-USER: CPT | Performed by: NURSE PRACTITIONER

## 2025-02-05 PROCEDURE — 3074F SYST BP LT 130 MM HG: CPT | Performed by: NURSE PRACTITIONER

## 2025-02-05 ASSESSMENT — COLUMBIA-SUICIDE SEVERITY RATING SCALE - C-SSRS
6. HAVE YOU EVER DONE ANYTHING, STARTED TO DO ANYTHING, OR PREPARED TO DO ANYTHING TO END YOUR LIFE?: NO
2. HAVE YOU ACTUALLY HAD ANY THOUGHTS OF KILLING YOURSELF?: NO
1. IN THE PAST MONTH, HAVE YOU WISHED YOU WERE DEAD OR WISHED YOU COULD GO TO SLEEP AND NOT WAKE UP?: NO

## 2025-02-05 ASSESSMENT — PATIENT HEALTH QUESTIONNAIRE - PHQ9
2. FEELING DOWN, DEPRESSED OR HOPELESS: NOT AT ALL
1. LITTLE INTEREST OR PLEASURE IN DOING THINGS: NOT AT ALL
SUM OF ALL RESPONSES TO PHQ9 QUESTIONS 1 AND 2: 0

## 2025-02-12 LAB
25(OH)D3+25(OH)D2 SERPL-MCNC: 37 NG/ML (ref 30–100)
ALBUMIN SERPL-MCNC: 4.4 G/DL (ref 3.6–5.1)
ALP SERPL-CCNC: 143 U/L (ref 37–153)
ALT SERPL-CCNC: 20 U/L (ref 6–29)
ANION GAP SERPL CALCULATED.4IONS-SCNC: 6 MMOL/L (CALC) (ref 7–17)
AST SERPL-CCNC: 20 U/L (ref 10–35)
BILIRUB SERPL-MCNC: 0.6 MG/DL (ref 0.2–1.2)
BUN SERPL-MCNC: 20 MG/DL (ref 7–25)
CALCIUM SERPL-MCNC: 9.7 MG/DL (ref 8.6–10.4)
CHLORIDE SERPL-SCNC: 101 MMOL/L (ref 98–110)
CHOLEST SERPL-MCNC: 165 MG/DL
CHOLEST/HDLC SERPL: 2.7 (CALC)
CO2 SERPL-SCNC: 29 MMOL/L (ref 20–32)
CREAT SERPL-MCNC: 0.78 MG/DL (ref 0.5–1.05)
EGFRCR SERPLBLD CKD-EPI 2021: 86 ML/MIN/1.73M2
ERYTHROCYTE [DISTWIDTH] IN BLOOD BY AUTOMATED COUNT: 12.1 % (ref 11–15)
EST. AVERAGE GLUCOSE BLD GHB EST-MCNC: 103 MG/DL
EST. AVERAGE GLUCOSE BLD GHB EST-SCNC: 5.7 MMOL/L
GLUCOSE SERPL-MCNC: 86 MG/DL (ref 65–99)
HBA1C MFR BLD: 5.2 % OF TOTAL HGB
HCT VFR BLD AUTO: 43.9 % (ref 35–45)
HDLC SERPL-MCNC: 62 MG/DL
HGB BLD-MCNC: 14.7 G/DL (ref 11.7–15.5)
LDLC SERPL CALC-MCNC: 85 MG/DL (CALC)
MCH RBC QN AUTO: 30.2 PG (ref 27–33)
MCHC RBC AUTO-ENTMCNC: 33.5 G/DL (ref 32–36)
MCV RBC AUTO: 90.1 FL (ref 80–100)
NONHDLC SERPL-MCNC: 103 MG/DL (CALC)
PLATELET # BLD AUTO: 297 THOUSAND/UL (ref 140–400)
PMV BLD REES-ECKER: 11.3 FL (ref 7.5–12.5)
POTASSIUM SERPL-SCNC: 4.4 MMOL/L (ref 3.5–5.3)
PROT SERPL-MCNC: 6.9 G/DL (ref 6.1–8.1)
RBC # BLD AUTO: 4.87 MILLION/UL (ref 3.8–5.1)
SODIUM SERPL-SCNC: 136 MMOL/L (ref 135–146)
TRIGL SERPL-MCNC: 85 MG/DL
TSH SERPL-ACNC: 1.43 MIU/L (ref 0.4–4.5)
WBC # BLD AUTO: 7 THOUSAND/UL (ref 3.8–10.8)

## 2025-02-15 DIAGNOSIS — F41.8 SITUATIONAL ANXIETY: ICD-10-CM

## 2025-02-20 RX ORDER — BUSPIRONE HYDROCHLORIDE 5 MG/1
TABLET ORAL
Qty: 60 TABLET | Refills: 5 | Status: SHIPPED | OUTPATIENT
Start: 2025-02-20

## 2025-03-07 ENCOUNTER — PATIENT MESSAGE (OUTPATIENT)
Dept: PRIMARY CARE | Facility: CLINIC | Age: 60
End: 2025-03-07
Payer: COMMERCIAL

## 2025-03-12 DIAGNOSIS — M81.0 AGE-RELATED OSTEOPOROSIS WITHOUT CURRENT PATHOLOGICAL FRACTURE: Primary | ICD-10-CM

## 2025-03-12 PROBLEM — Z98.84 BARIATRIC SURGERY STATUS: Status: RESOLVED | Noted: 2023-09-17 | Resolved: 2025-03-12

## 2025-03-12 PROBLEM — Z13.1 SCREENING FOR DIABETES MELLITUS: Status: RESOLVED | Noted: 2024-06-01 | Resolved: 2025-03-12

## 2025-03-12 PROBLEM — E66.9 OBESITY (BMI 30-39.9): Status: RESOLVED | Noted: 2024-12-05 | Resolved: 2025-03-12

## 2025-03-12 PROBLEM — N89.8 DISCHARGE FROM THE VAGINA: Status: RESOLVED | Noted: 2023-09-17 | Resolved: 2025-03-12

## 2025-03-12 PROBLEM — K91.2 POSTOPERATIVE MALABSORPTION (HHS-HCC): Status: RESOLVED | Noted: 2023-10-16 | Resolved: 2025-03-12

## 2025-03-12 PROBLEM — N89.8 VAGINAL DISCHARGE: Status: RESOLVED | Noted: 2023-09-17 | Resolved: 2025-03-12

## 2025-03-12 PROBLEM — E66.812 CLASS 2 OBESITY: Status: RESOLVED | Noted: 2024-12-05 | Resolved: 2025-03-12

## 2025-03-12 PROBLEM — R11.2 NAUSEA AND VOMITING: Status: RESOLVED | Noted: 2022-11-29 | Resolved: 2025-03-12

## 2025-03-12 PROBLEM — E66.01 MORBID OBESITY (MULTI): Status: RESOLVED | Noted: 2024-05-13 | Resolved: 2025-03-12

## 2025-03-12 PROBLEM — R63.5 ABNORMAL WEIGHT GAIN: Status: RESOLVED | Noted: 2023-09-17 | Resolved: 2025-03-12

## 2025-03-12 ASSESSMENT — ENCOUNTER SYMPTOMS
NERVOUS/ANXIOUS: 1
ACTIVITY CHANGE: 1

## 2025-03-12 NOTE — PROGRESS NOTES
Subjective   Patient ID: Ida Diego is a 60 y.o. female who presents for Follow-up (Review test results).    HPI   Pt has been busy at work.    On feet a lot.   Doing OK on current medications.  She had bone Density test in late December  Has lost some height.    + family hx of osteoporosis.    Some issues with Anxiety.    Work is stressful.  No issues with esophagus.    Review of Systems   Constitutional:  Positive for activity change.   Psychiatric/Behavioral:  The patient is nervous/anxious.    All other systems reviewed and are negative.      Objective   /80   Temp 36.7 °C (98 °F)   Wt 76.2 kg (168 lb)   BMI 27.96 kg/m²     Physical Exam  Vitals and nursing note reviewed.   Constitutional:       Appearance: Normal appearance.   HENT:      Head: Normocephalic and atraumatic.      Mouth/Throat:      Pharynx: Oropharynx is clear.   Cardiovascular:      Rate and Rhythm: Normal rate and regular rhythm.      Pulses: Normal pulses.      Heart sounds: Normal heart sounds.   Pulmonary:      Effort: Pulmonary effort is normal.      Breath sounds: Normal breath sounds.   Neurological:      Mental Status: She is alert and oriented to person, place, and time.   Psychiatric:         Mood and Affect: Mood normal.         Behavior: Behavior normal.         Thought Content: Thought content normal.         Judgment: Judgment normal.         Assessment/Plan   Assessment & Plan  Age-related osteoporosis without current pathological fracture  Will attempt to get Prolia for pt.         Situational anxiety         History of bariatric surgery  Doing fine - weight is stable.

## 2025-03-21 ENCOUNTER — TELEPHONE (OUTPATIENT)
Dept: PRIMARY CARE | Facility: CLINIC | Age: 60
End: 2025-03-21
Payer: COMMERCIAL

## 2025-03-21 NOTE — TELEPHONE ENCOUNTER
Sachi from [employer] calling about pt, they have a question about pt's insurance. Please call them back at your earliest convenience @ [phone].

## 2025-03-26 ENCOUNTER — CLINICAL SUPPORT (OUTPATIENT)
Dept: PRIMARY CARE | Facility: CLINIC | Age: 60
End: 2025-03-26
Payer: COMMERCIAL

## 2025-03-26 VITALS — TEMPERATURE: 97.2 F

## 2025-03-26 DIAGNOSIS — M81.0 AGE-RELATED OSTEOPOROSIS WITHOUT CURRENT PATHOLOGICAL FRACTURE: ICD-10-CM

## 2025-03-26 PROCEDURE — 96372 THER/PROPH/DIAG INJ SC/IM: CPT | Performed by: INTERNAL MEDICINE

## 2025-03-26 NOTE — PROGRESS NOTES
Pt came in for her Prolia injection. Pt was given prolia to her right subcutaneous arm. Pt tolerated it well. Pt will be called to be scheduled for her next injection. I did have patient wait 15 mins since this was her first injection.    2:42PM- injection given  2:57PM- no reaction was found     CMP: 2/11/25    NDC: 22910-270-33  LOT: [id number]  EXP: 06/30/2027  MANU: Azra

## 2025-05-13 ENCOUNTER — OFFICE VISIT (OUTPATIENT)
Dept: PRIMARY CARE | Facility: CLINIC | Age: 60
End: 2025-05-13
Payer: COMMERCIAL

## 2025-05-13 VITALS
SYSTOLIC BLOOD PRESSURE: 100 MMHG | HEART RATE: 76 BPM | DIASTOLIC BLOOD PRESSURE: 70 MMHG | OXYGEN SATURATION: 98 % | TEMPERATURE: 97.5 F

## 2025-05-13 ASSESSMENT — ENCOUNTER SYMPTOMS
LOSS OF SENSATION IN FEET: 0
DEPRESSION: 1
OCCASIONAL FEELINGS OF UNSTEADINESS: 0

## 2025-05-13 ASSESSMENT — PATIENT HEALTH QUESTIONNAIRE - PHQ9
1. LITTLE INTEREST OR PLEASURE IN DOING THINGS: SEVERAL DAYS
10. IF YOU CHECKED OFF ANY PROBLEMS, HOW DIFFICULT HAVE THESE PROBLEMS MADE IT FOR YOU TO DO YOUR WORK, TAKE CARE OF THINGS AT HOME, OR GET ALONG WITH OTHER PEOPLE: NOT DIFFICULT AT ALL
SUM OF ALL RESPONSES TO PHQ9 QUESTIONS 1 AND 2: 1
2. FEELING DOWN, DEPRESSED OR HOPELESS: NOT AT ALL

## 2025-05-19 ENCOUNTER — APPOINTMENT (OUTPATIENT)
Dept: PRIMARY CARE | Facility: CLINIC | Age: 60
End: 2025-05-19
Payer: COMMERCIAL

## 2025-07-08 ENCOUNTER — APPOINTMENT (OUTPATIENT)
Dept: PRIMARY CARE | Facility: CLINIC | Age: 60
End: 2025-07-08
Payer: COMMERCIAL

## 2025-08-25 ENCOUNTER — TELEPHONE (OUTPATIENT)
Dept: PRIMARY CARE | Facility: CLINIC | Age: 60
End: 2025-08-25
Payer: COMMERCIAL

## 2025-09-02 ENCOUNTER — ANCILLARY PROCEDURE (OUTPATIENT)
Dept: URGENT CARE | Age: 60
End: 2025-09-02
Payer: COMMERCIAL

## 2025-09-02 ENCOUNTER — OFFICE VISIT (OUTPATIENT)
Dept: URGENT CARE | Age: 60
End: 2025-09-02
Payer: COMMERCIAL

## 2025-09-02 ENCOUNTER — PATIENT MESSAGE (OUTPATIENT)
Dept: PRIMARY CARE | Facility: CLINIC | Age: 60
End: 2025-09-02
Payer: COMMERCIAL

## 2025-09-02 VITALS
OXYGEN SATURATION: 98 % | TEMPERATURE: 97.4 F | DIASTOLIC BLOOD PRESSURE: 82 MMHG | HEART RATE: 62 BPM | SYSTOLIC BLOOD PRESSURE: 121 MMHG | RESPIRATION RATE: 17 BRPM

## 2025-09-02 DIAGNOSIS — R07.81 RIB PAIN ON RIGHT SIDE: Primary | ICD-10-CM

## 2025-09-02 DIAGNOSIS — Z87.81 HISTORY OF RIB FRACTURE: ICD-10-CM

## 2025-09-02 DIAGNOSIS — R07.81 RIB PAIN ON RIGHT SIDE: ICD-10-CM

## 2025-09-02 PROCEDURE — 71101 X-RAY EXAM UNILAT RIBS/CHEST: CPT | Mod: RIGHT SIDE | Performed by: PHYSICIAN ASSISTANT

## 2025-09-02 RX ORDER — LIDOCAINE 50 MG/G
1 PATCH TOPICAL DAILY
Qty: 10 PATCH | Refills: 0 | Status: SHIPPED | OUTPATIENT
Start: 2025-09-02

## 2025-09-02 ASSESSMENT — ENCOUNTER SYMPTOMS
FEVER: 0
WHEEZING: 0
SORE THROAT: 0
NUMBNESS: 0
COLOR CHANGE: 0
SHORTNESS OF BREATH: 0
COUGH: 1
CHEST TIGHTNESS: 0
CHILLS: 0
RHINORRHEA: 0